# Patient Record
Sex: FEMALE | Race: BLACK OR AFRICAN AMERICAN | NOT HISPANIC OR LATINO | URBAN - METROPOLITAN AREA
[De-identification: names, ages, dates, MRNs, and addresses within clinical notes are randomized per-mention and may not be internally consistent; named-entity substitution may affect disease eponyms.]

---

## 2017-10-07 ENCOUNTER — EMERGENCY (EMERGENCY)
Facility: HOSPITAL | Age: 47
LOS: 1 days | Discharge: HOME | End: 2017-10-07
Admitting: EMERGENCY MEDICINE
Payer: COMMERCIAL

## 2017-10-07 VITALS
WEIGHT: 215.17 LBS | TEMPERATURE: 98 F | RESPIRATION RATE: 16 BRPM | OXYGEN SATURATION: 100 % | SYSTOLIC BLOOD PRESSURE: 114 MMHG | HEART RATE: 102 BPM | HEIGHT: 68 IN | DIASTOLIC BLOOD PRESSURE: 68 MMHG

## 2017-10-07 DIAGNOSIS — Z79.2 LONG TERM (CURRENT) USE OF ANTIBIOTICS: ICD-10-CM

## 2017-10-07 DIAGNOSIS — Z79.1 LONG TERM (CURRENT) USE OF NON-STEROIDAL ANTI-INFLAMMATORIES (NSAID): ICD-10-CM

## 2017-10-07 DIAGNOSIS — L73.2 HIDRADENITIS SUPPURATIVA: ICD-10-CM

## 2017-10-07 DIAGNOSIS — Z79.899 OTHER LONG TERM (CURRENT) DRUG THERAPY: ICD-10-CM

## 2017-10-07 PROCEDURE — 99284 EMERGENCY DEPT VISIT MOD MDM: CPT | Mod: 25

## 2017-10-07 PROCEDURE — 96372 THER/PROPH/DIAG INJ SC/IM: CPT

## 2017-10-07 PROCEDURE — 99284 EMERGENCY DEPT VISIT MOD MDM: CPT

## 2017-10-07 RX ORDER — AZTREONAM 2 G
1 VIAL (EA) INJECTION
Qty: 14 | Refills: 0 | OUTPATIENT
Start: 2017-10-07 | End: 2017-10-14

## 2017-10-07 RX ORDER — AZTREONAM 2 G
1 VIAL (EA) INJECTION
Qty: 14 | Refills: 0
Start: 2017-10-07 | End: 2017-10-14

## 2017-10-07 RX ORDER — KETOROLAC TROMETHAMINE 30 MG/ML
30 SYRINGE (ML) INJECTION ONCE
Qty: 0 | Refills: 0 | Status: DISCONTINUED | OUTPATIENT
Start: 2017-10-07 | End: 2017-10-07

## 2017-10-07 RX ORDER — CEPHALEXIN 500 MG
1 CAPSULE ORAL
Qty: 14 | Refills: 0
Start: 2017-10-07 | End: 2017-10-14

## 2017-10-07 RX ORDER — IBUPROFEN 200 MG
600 TABLET ORAL ONCE
Qty: 0 | Refills: 0 | Status: DISCONTINUED | OUTPATIENT
Start: 2017-10-07 | End: 2017-10-07

## 2017-10-07 RX ORDER — CEPHALEXIN 500 MG
1 CAPSULE ORAL
Qty: 14 | Refills: 0 | OUTPATIENT
Start: 2017-10-07 | End: 2017-10-14

## 2017-10-07 RX ORDER — IBUPROFEN 200 MG
1 TABLET ORAL
Qty: 28 | Refills: 0
Start: 2017-10-07 | End: 2017-10-14

## 2017-10-07 RX ORDER — IBUPROFEN 200 MG
1 TABLET ORAL
Qty: 28 | Refills: 0 | OUTPATIENT
Start: 2017-10-07 | End: 2017-10-14

## 2017-10-07 RX ADMIN — Medication 30 MILLIGRAM(S): at 21:35

## 2017-10-07 RX ADMIN — Medication 30 MILLIGRAM(S): at 21:42

## 2017-10-07 NOTE — ED ADULT NURSE NOTE - OBJECTIVE STATEMENT
Patient to ED alert and orientedx3, complaining of sores around buttocks x 2 months. Denies constipation or straining. Denies abdominal pain.

## 2017-10-07 NOTE — ED PROVIDER NOTE - OBJECTIVE STATEMENT
46 y/o female with a PMHx of hidradenitis suppurativa presents with outbreak. Last year she was treated by dermatology with good results. Pt reports last appointment with dermatology was in January of 2017 without issues this year. Pt reports pain when she sits and states that there is drainage on one of the concerned areas. She denies the following: fever, redness, swelling, recent treatment of antibiotics or trauma. Pt reports she is present due to the pain and she has an appointment [facility] week for continued treatment of her condition.

## 2017-10-07 NOTE — ED PROVIDER NOTE - CARE PLAN
Principal Discharge DX:	Hidradenitis suppurativa  Instructions for follow-up, activity and diet:	Please take medications as directed and follow up with your dermatologists.

## 2017-10-07 NOTE — ED PROVIDER NOTE - MEDICAL DECISION MAKING DETAILS
48 y/o female with hidradenitis suppurativa. PE: multiple deep seated nodules with clear fluid and fibrotic scars scattered. One small non-erythemic actively draining boil not an abscess. No fever, chills, warmth and no signs of cellulitis. Pt was irrigated with one boil drained with minimal pressure. Pt treated with toradol with alleviation of pain and given abx with derm follow up. 48 y/o female with hidradenitis suppurativa. PE: multiple deep seated nodules with clear fluid and fibrotic scars scattered. One small non-erythemic actively draining boil not an abscess. No fever, chills, warmth and no signs of cellulitis. Skin irrigated with one boil drained with minimal pressure. Pt treated with toradol with alleviation of pain and given abx with derm follow up.

## 2017-12-26 ENCOUNTER — EMERGENCY (EMERGENCY)
Facility: HOSPITAL | Age: 47
LOS: 1 days | Discharge: HOME | End: 2017-12-26
Admitting: EMERGENCY MEDICINE
Payer: COMMERCIAL

## 2017-12-26 VITALS
HEART RATE: 94 BPM | SYSTOLIC BLOOD PRESSURE: 136 MMHG | TEMPERATURE: 97 F | RESPIRATION RATE: 18 BRPM | DIASTOLIC BLOOD PRESSURE: 78 MMHG | OXYGEN SATURATION: 99 %

## 2017-12-26 PROCEDURE — 99284 EMERGENCY DEPT VISIT MOD MDM: CPT

## 2017-12-26 PROCEDURE — 96372 THER/PROPH/DIAG INJ SC/IM: CPT

## 2017-12-26 PROCEDURE — 99284 EMERGENCY DEPT VISIT MOD MDM: CPT | Mod: 25

## 2017-12-26 RX ORDER — FLUTICASONE PROPIONATE 50 MCG
1 SPRAY, SUSPENSION NASAL
Qty: 1 | Refills: 0
Start: 2017-12-26 | End: 2018-01-24

## 2017-12-26 RX ORDER — CLINDAMYCIN PHOSPHATE GEL USP, 1% 10 MG/G
1 GEL TOPICAL
Qty: 1 | Refills: 0
Start: 2017-12-26 | End: 2018-01-08

## 2017-12-26 RX ORDER — CEPHALEXIN 500 MG
1 CAPSULE ORAL
Qty: 14 | Refills: 0 | OUTPATIENT
Start: 2017-12-26 | End: 2018-01-01

## 2017-12-26 RX ORDER — IBUPROFEN 200 MG
1 TABLET ORAL
Qty: 15 | Refills: 0
Start: 2017-12-26 | End: 2017-12-30

## 2017-12-26 RX ORDER — AZTREONAM 2 G
1 VIAL (EA) INJECTION
Qty: 14 | Refills: 0 | OUTPATIENT
Start: 2017-12-26 | End: 2018-01-01

## 2017-12-26 RX ORDER — KETOROLAC TROMETHAMINE 30 MG/ML
60 SYRINGE (ML) INJECTION ONCE
Qty: 0 | Refills: 0 | Status: DISCONTINUED | OUTPATIENT
Start: 2017-12-26 | End: 2017-12-26

## 2017-12-26 RX ADMIN — Medication 60 MILLIGRAM(S): at 21:41

## 2017-12-26 NOTE — ED PROVIDER NOTE - CARE PLAN
Principal Discharge DX:	Hidradenitis suppurativa  Secondary Diagnosis:	Acute non-recurrent sinusitis of other sinus

## 2017-12-26 NOTE — ED PROVIDER NOTE - MEDICAL DECISION MAKING DETAILS
46 y/o female with a PMHx of hidradenitis suppurativa presents with pain to buttocks. No fever, chills. Pt states she is having a hidraneitis flare. Responded in past to abx and motrin. Also with sinus congestion x 1 week. VSS. Pharynx clear, no sinus tenderness, lungs cta b/l. Buttocks and between inner thighs- Multiple deep seated nodules with clear fluid and fibrotic scars scattered. Some larger areas of induration without fluctuance. No erythema. Bedside sono done and no obvious collection seen. Will give doxy, clindamycin lotion, motrin. Pt encouraged to f/u with dermatology.

## 2017-12-26 NOTE — ED PROVIDER NOTE - PHYSICAL EXAMINATION
CONSTITUTIONAL: Well-appearing; well-nourished; in no apparent distress.   HEAD: Normocephalic; atraumatic.   EYES: PERRL; EOM intact; conjunctiva and sclera clear  ENT: normal nose; no rhinorrhea; normal pharynx with no erythema or lesions. No mucosal edema. No sinus tenderness  NECK: Supple; non-tender;   CARDIOVASCULAR: Normal S1, S2; no murmurs, rubs, or gallops. Regular rate and rhythm.   RESPIRATORY: Breathing easily; breath sounds clear and equal bilaterally; no wheezes, rhonchi, or rales.  MSK: FROM at all extremities, normal tone   EXT: No cyanosis or edema; N/V intact  SKIN: buttocks and between thighs- multiple deep seated nodules with clear fluid and fibrotic scars scattered. Some larger areas of induration without fluctuance. No erythema.

## 2017-12-26 NOTE — ED PROVIDER NOTE - OBJECTIVE STATEMENT
46 y/o female with a PMHx of hidradenitis suppurativa presents with pain to buttocks. Pt states she has suffered with swelling to the buttocks x 1 year but today the pain worsened. Pt seen in oct and was given abx and motrin which helped her. Pt states she hasn't seen her dermatologist in a long time. Denies fever, chills,  discharge, rectal pain, redness. Pt also c/o nasal congestion and dry cough x 1 week. Denies sore throat, ha, bodyaches, cp, sob. Denies sick contacts or recent travel. Pt states she took theraflu with some relief.

## 2017-12-26 NOTE — ED ADULT NURSE NOTE - OBJECTIVE STATEMENT
Pt presents to ED with c/o swelling on buttocks and also c/o sinus congestion. Pt denies drainage. Seen by provider, will carry out orders.

## 2017-12-30 DIAGNOSIS — Z79.1 LONG TERM (CURRENT) USE OF NON-STEROIDAL ANTI-INFLAMMATORIES (NSAID): ICD-10-CM

## 2017-12-30 DIAGNOSIS — Z79.899 OTHER LONG TERM (CURRENT) DRUG THERAPY: ICD-10-CM

## 2017-12-30 DIAGNOSIS — L73.2 HIDRADENITIS SUPPURATIVA: ICD-10-CM

## 2017-12-30 DIAGNOSIS — J01.80 OTHER ACUTE SINUSITIS: ICD-10-CM

## 2017-12-30 DIAGNOSIS — M53.3 SACROCOCCYGEAL DISORDERS, NOT ELSEWHERE CLASSIFIED: ICD-10-CM

## 2017-12-30 DIAGNOSIS — Z79.2 LONG TERM (CURRENT) USE OF ANTIBIOTICS: ICD-10-CM

## 2018-09-26 ENCOUNTER — EMERGENCY (EMERGENCY)
Facility: HOSPITAL | Age: 48
LOS: 1 days | Discharge: HOME | End: 2018-09-26
Attending: EMERGENCY MEDICINE | Admitting: EMERGENCY MEDICINE
Payer: COMMERCIAL

## 2018-09-26 VITALS
HEIGHT: 68 IN | DIASTOLIC BLOOD PRESSURE: 82 MMHG | SYSTOLIC BLOOD PRESSURE: 137 MMHG | RESPIRATION RATE: 16 BRPM | OXYGEN SATURATION: 99 % | TEMPERATURE: 98 F | HEART RATE: 84 BPM

## 2018-09-26 DIAGNOSIS — Z79.899 OTHER LONG TERM (CURRENT) DRUG THERAPY: ICD-10-CM

## 2018-09-26 DIAGNOSIS — Z79.52 LONG TERM (CURRENT) USE OF SYSTEMIC STEROIDS: ICD-10-CM

## 2018-09-26 DIAGNOSIS — L73.2 HIDRADENITIS SUPPURATIVA: ICD-10-CM

## 2018-09-26 DIAGNOSIS — Z79.1 LONG TERM (CURRENT) USE OF NON-STEROIDAL ANTI-INFLAMMATORIES (NSAID): ICD-10-CM

## 2018-09-26 DIAGNOSIS — R51 HEADACHE: ICD-10-CM

## 2018-09-26 DIAGNOSIS — Z79.2 LONG TERM (CURRENT) USE OF ANTIBIOTICS: ICD-10-CM

## 2018-09-26 PROCEDURE — 99283 EMERGENCY DEPT VISIT LOW MDM: CPT

## 2018-09-26 PROCEDURE — 99283 EMERGENCY DEPT VISIT LOW MDM: CPT | Mod: 25

## 2018-09-26 PROCEDURE — 96372 THER/PROPH/DIAG INJ SC/IM: CPT

## 2018-09-26 RX ORDER — CYCLOBENZAPRINE HYDROCHLORIDE 10 MG/1
1 TABLET, FILM COATED ORAL
Qty: 15 | Refills: 0
Start: 2018-09-26 | End: 2018-09-30

## 2018-09-26 RX ORDER — CYCLOBENZAPRINE HYDROCHLORIDE 10 MG/1
1 TABLET, FILM COATED ORAL
Qty: 15 | Refills: 0 | OUTPATIENT
Start: 2018-09-26 | End: 2018-09-30

## 2018-09-26 RX ORDER — KETOROLAC TROMETHAMINE 30 MG/ML
15 SYRINGE (ML) INJECTION ONCE
Qty: 0 | Refills: 0 | Status: DISCONTINUED | OUTPATIENT
Start: 2018-09-26 | End: 2018-09-26

## 2018-09-26 RX ADMIN — Medication 15 MILLIGRAM(S): at 20:00

## 2018-09-26 NOTE — ED ADULT NURSE NOTE - OBJECTIVE STATEMENT
pt received in [facility] x 3 pt c/o headache for about a week , headache resolved , pt only c/o Right sided neck pain , [t states ' I think its muscular they usually  give me Toradol and it works "

## 2018-09-26 NOTE — ED PROVIDER NOTE - MEDICAL DECISION MAKING DETAILS
48F with chronic headaches, throbbing, improved with naprosyn but continues 48F with chronic headaches, throbbing, improved with naprosyn but continues to have dull persistent headache. No associated weakness, n/v, or nuchal rigidity. Pt nontoxic appearing. Improved symptoms with Toradol 10mg IM. Pt also with R groin hidadrenitis suppurativa. Prescribing topical doxycycline 100mg BID x 14 days. 48F with chronic headaches, throbbing, improved with naprosyn but continues to have dull persistent headache. No associated weakness, n/v, or nuchal rigidity. Pt nontoxic appearing. Improved symptoms with Toradol 10mg IM. Pt also with R groin hidadrenitis suppurativa. Prescribing doxycycline 100mg BID x 14 days.

## 2018-09-26 NOTE — ED PROVIDER NOTE - ATTENDING CONTRIBUTION TO CARE
agree w resident charting and plan, pt stable for out antibiotics, no drainable collection ,   H/A w no neuro deficits and mild / resolved.   stable for outpt f/u    emergent return instructions were discussed with patient

## 2018-09-26 NOTE — ED PROVIDER NOTE - CARE PLAN
Principal Discharge DX:	Headache Principal Discharge DX:	Headache  Secondary Diagnosis:	Hidradenitis suppurativa

## 2018-09-26 NOTE — ED PROVIDER NOTE - PHYSICAL EXAMINATION
PHYSICAL EXAM:    Constitutional: middle-aged female in NAD, appearing younger than stated age  Head: NC/AT  Eyes: EOMI, anicteric sclera  ENT: no nasal discharge; uvula midline, no oropharyngeal erythema or exudates; MMM  Neck: supple  Respiratory: CTA B/L; no W/R/R, no retractions  Cardiac: +S1/S2; RRR; no M/R/G; PMI non-displaced  Gastrointestinal: abdomen soft, NT/ND; no rebound or guarding; +BSx4  Back: spine midline, no bony tenderness or step-offs; no CVAT B/L  Extremities: WWP, no clubbing or cyanosis; no peripheral edema  Dermatologic: skin warm, dry and intact; no rashes, wounds, or scars  Neurologic: AAOx3; CNII-XII grossly intact; no focal deficits  Psychiatric: affect and characteristics of appearance, verbalizations, behaviors are appropriate PHYSICAL EXAM:    Constitutional: middle-aged female in NAD, appearing younger than stated age  Head: NC/AT  Eyes: EOMI, anicteric sclera  ENT: no nasal discharge; uvula midline, no oropharyngeal erythema or exudates; MMM  Neck: supple; NO NUCHAL RIGIDTY  Respiratory: CTA B/L; no W/R/R, no retractions  Cardiac: +S1/S2; RRR; no M/R/G; PMI non-displaced  Gastrointestinal: abdomen soft, NT/ND; no rebound or guarding; +BSx4  Back: spine midline, no bony tenderness or step-offs; no CVAT B/L  Extremities: WWP, no clubbing or cyanosis; no peripheral edema  Dermatologic: skin warm, dry and intact; no rashes, wounds, or scars  Neurologic: AAOx3; CNII-XII grossly intact; no focal deficits  Psychiatric: affect and characteristics of appearance, verbalizations, behaviors are appropriate

## 2018-09-26 NOTE — ED ADULT TRIAGE NOTE - CHIEF COMPLAINT QUOTE
has had HA into neck pain for a week - intermittent and varying intensities - last took naprosyn a few days ago; when she goes to the hospital usually gets Toradol; denies any other symptoms

## 2018-09-26 NOTE — ED PROVIDER NOTE - NS ED ROS FT
REVIEW OF SYSTEMS:    CONSTITUTIONAL: No weakness, fevers or chills  EYES/ENT: occasional blurriness;  No vertigo or throat pain   NECK: No pain or stiffness  RESPIRATORY: No cough, wheezing, hemoptysis; No shortness of breath  CARDIOVASCULAR: No chest pain or palpitations  GASTROINTESTINAL: No abdominal or epigastric pain. No nausea, vomiting, or hematemesis; No diarrhea or constipation. No melena or hematochezia.  GENITOURINARY: No dysuria, frequency or hematuria  NEUROLOGICAL: No numbness or weakness  SKIN: No itching, burning, rashes, or lesions   All other review of systems is negative unless indicated above.

## 2018-10-01 ENCOUNTER — TRANSCRIPTION ENCOUNTER (OUTPATIENT)
Age: 48
End: 2018-10-01

## 2018-10-01 ENCOUNTER — APPOINTMENT (OUTPATIENT)
Dept: INTERNAL MEDICINE | Facility: CLINIC | Age: 48
End: 2018-10-01

## 2018-10-02 ENCOUNTER — APPOINTMENT (OUTPATIENT)
Dept: INTERNAL MEDICINE | Facility: CLINIC | Age: 48
End: 2018-10-02

## 2018-10-09 ENCOUNTER — EMERGENCY (EMERGENCY)
Facility: HOSPITAL | Age: 48
LOS: 1 days | Discharge: HOME | End: 2018-10-09
Admitting: PHYSICIAN ASSISTANT
Payer: COMMERCIAL

## 2018-10-09 VITALS
TEMPERATURE: 98 F | DIASTOLIC BLOOD PRESSURE: 68 MMHG | HEIGHT: 68 IN | WEIGHT: 231.04 LBS | SYSTOLIC BLOOD PRESSURE: 119 MMHG | HEART RATE: 98 BPM | OXYGEN SATURATION: 99 % | RESPIRATION RATE: 18 BRPM

## 2018-10-09 PROCEDURE — 99283 EMERGENCY DEPT VISIT LOW MDM: CPT | Mod: 25

## 2018-10-09 PROCEDURE — 99284 EMERGENCY DEPT VISIT MOD MDM: CPT

## 2018-10-09 PROCEDURE — 96372 THER/PROPH/DIAG INJ SC/IM: CPT

## 2018-10-09 RX ORDER — KETOROLAC TROMETHAMINE 30 MG/ML
60 SYRINGE (ML) INJECTION ONCE
Qty: 0 | Refills: 0 | Status: DISCONTINUED | OUTPATIENT
Start: 2018-10-09 | End: 2018-10-09

## 2018-10-09 RX ADMIN — Medication 60 MILLIGRAM(S): at 14:18

## 2018-10-09 NOTE — ED ADULT NURSE NOTE - CHPI ED NUR SYMPTOMS NEG
no nausea/no dizziness/no loss of consciousness/no numbness/no confusion/no vomiting/no blurred vision/no change in level of consciousness/no fever

## 2018-10-09 NOTE — ED PROVIDER NOTE - NS ED ROS FT
CONSTITUTIONAL: No fever, chills, or weakness  NEURO: No dizziness, no syncope; No focal weakness/tingling/numbness  EYES: No visual changes  ENT: No rhinorrhea or sore throat  PULM: No cough or dyspnea  CV: No chest pain or palpitations  GI: No abdominal pain, vomiting, or diarrhea  : No dysuria, hematuria, frequency  MSK: No back pain, no joint pain  SKIN: no rash or unusual bruising

## 2018-10-09 NOTE — ED PROVIDER NOTE - OBJECTIVE STATEMENT
pt with headaches for over a year.  has had CT and MR negative, told migraines in the past.  she has had her current headache for about a month, located left occipital and radiating to left posterior neck.  constant.  pain worse with turning head.  no fever or chills, nausea/vomiting, focal neuro symptoms.  saw her PCP Dr Hogan last week for this, felt better after a shot of toradol in the office, and taking cyclobenzaprine at home without much relief.  she has an appointment this friday with a neurologist.  has used NSAIDs without much relief.

## 2018-10-09 NOTE — ED PROVIDER NOTE - MEDICAL DECISION MAKING DETAILS
Chronic left sided headache.  Seems muscle tension in etiology.  Not characteristic of SAH/ICH, meningitis, or other serious causes of headache.  Taking muscle relaxants without relief and does not want to continue to use NSAIDS chronically due to her concern for SE with long term use.  Will prescribe short course Fioricet and she has neuro appointment this week.

## 2018-10-09 NOTE — ED PROVIDER NOTE - PHYSICAL EXAMINATION
CONSTITUTIONAL: WD,WN. NAD.    SKIN: Normal color and turgor. No rash.    HEAD: NC/AT.  EYES: Conjunctiva clear. EOMI. PERRL.    ENT: Airway patent, OP without erythema, tonsillar swelling or exudate; uvula midline without swelling. Nasal mucosa clear, no rhinorrhea.   RESPIRATORY:  Breathing non-labored. No retractions or accessory muscle use.  Lungs CTA bilat.  CARDIOVASCULAR:  RRR, S1S2. No M/R/G.      GI:  Abdomen soft, nontender.    MSK: Tenderness to left posterior scalp muscles and left sided posterior neck muscles.   Full ROM, no meningismus.  No lower extremity edema or calf tenderness.  No joint swelling or ROM limitation.  NEURO: Alert and oriented; CN II-XII  intact. Speech clear. 5/5 strength in all extremities.  F-N intact. Normal balance and gait.

## 2018-10-09 NOTE — ED ADULT NURSE NOTE - NSIMPLEMENTINTERV_GEN_ALL_ED
Implemented All Universal Safety Interventions:  [place] to call system. Call bell, personal items and telephone within reach. Instruct patient to call for assistance. Room bathroom lighting operational. Non-slip footwear when patient is off stretcher. Physically safe environment: no spills, clutter or unnecessary equipment. Stretcher in lowest position, wheels locked, appropriate side rails in place.

## 2018-10-12 ENCOUNTER — TRANSCRIPTION ENCOUNTER (OUTPATIENT)
Age: 48
End: 2018-10-12

## 2018-10-13 DIAGNOSIS — G44.229 CHRONIC TENSION-TYPE HEADACHE, NOT INTRACTABLE: ICD-10-CM

## 2018-10-13 DIAGNOSIS — M54.2 CERVICALGIA: ICD-10-CM

## 2018-10-13 DIAGNOSIS — Z79.899 OTHER LONG TERM (CURRENT) DRUG THERAPY: ICD-10-CM

## 2018-10-16 ENCOUNTER — TRANSCRIPTION ENCOUNTER (OUTPATIENT)
Age: 48
End: 2018-10-16

## 2018-10-18 ENCOUNTER — TRANSCRIPTION ENCOUNTER (OUTPATIENT)
Age: 48
End: 2018-10-18

## 2018-11-15 ENCOUNTER — TRANSCRIPTION ENCOUNTER (OUTPATIENT)
Age: 48
End: 2018-11-15

## 2018-11-16 ENCOUNTER — APPOINTMENT (OUTPATIENT)
Dept: INTERNAL MEDICINE | Facility: CLINIC | Age: 48
End: 2018-11-16
Payer: MEDICAID

## 2018-11-16 VITALS
SYSTOLIC BLOOD PRESSURE: 140 MMHG | DIASTOLIC BLOOD PRESSURE: 77 MMHG | HEART RATE: 104 BPM | WEIGHT: 230 LBS | OXYGEN SATURATION: 99 % | BODY MASS INDEX: 34.86 KG/M2 | HEIGHT: 68 IN | TEMPERATURE: 98.5 F

## 2018-11-16 DIAGNOSIS — M50.90 CERVICAL DISC DISORDER, UNSPECIFIED, UNSPECIFIED CERVICAL REGION: ICD-10-CM

## 2018-11-16 PROCEDURE — 36415 COLL VENOUS BLD VENIPUNCTURE: CPT

## 2018-11-16 PROCEDURE — 99204 OFFICE O/P NEW MOD 45 MIN: CPT | Mod: 25

## 2018-11-20 ENCOUNTER — OTHER (OUTPATIENT)
Age: 48
End: 2018-11-20

## 2018-11-20 LAB
ANION GAP SERPL CALC-SCNC: 12 MMOL/L
APPEARANCE: CLEAR
BACTERIA: ABNORMAL
BASOPHILS # BLD AUTO: 0.03 K/UL
BASOPHILS NFR BLD AUTO: 0.5 %
BILIRUBIN URINE: NEGATIVE
BLOOD URINE: NEGATIVE
BUN SERPL-MCNC: 8 MG/DL
CALCIUM SERPL-MCNC: 9.7 MG/DL
CHLORIDE SERPL-SCNC: 104 MMOL/L
CHOLEST SERPL-MCNC: 156 MG/DL
CHOLEST/HDLC SERPL: 2.9 RATIO
CO2 SERPL-SCNC: 26 MMOL/L
COLOR: YELLOW
CREAT SERPL-MCNC: 0.67 MG/DL
EOSINOPHIL # BLD AUTO: 0.1 K/UL
EOSINOPHIL NFR BLD AUTO: 1.8 %
GLUCOSE QUALITATIVE U: NEGATIVE MG/DL
GLUCOSE SERPL-MCNC: 96 MG/DL
HBA1C MFR BLD HPLC: 5.7 %
HCT VFR BLD CALC: 39.3 %
HDLC SERPL-MCNC: 53 MG/DL
HGB BLD-MCNC: 11.9 G/DL
HYALINE CASTS: 1 /LPF
IMM GRANULOCYTES NFR BLD AUTO: 0.2 %
KETONES URINE: NEGATIVE
LDLC SERPL CALC-MCNC: 92 MG/DL
LEUKOCYTE ESTERASE URINE: NEGATIVE
LYMPHOCYTES # BLD AUTO: 1.99 K/UL
LYMPHOCYTES NFR BLD AUTO: 35.1 %
MAN DIFF?: NORMAL
MCHC RBC-ENTMCNC: 29.6 PG
MCHC RBC-ENTMCNC: 30.3 GM/DL
MCV RBC AUTO: 97.8 FL
MICROSCOPIC-UA: NORMAL
MONOCYTES # BLD AUTO: 0.42 K/UL
MONOCYTES NFR BLD AUTO: 7.4 %
NEUTROPHILS # BLD AUTO: 3.12 K/UL
NEUTROPHILS NFR BLD AUTO: 55 %
NITRITE URINE: NEGATIVE
PH URINE: 6.5
PLATELET # BLD AUTO: 390 K/UL
POTASSIUM SERPL-SCNC: 4.3 MMOL/L
PROTEIN URINE: NEGATIVE MG/DL
RBC # BLD: 4.02 M/UL
RBC # FLD: 14.7 %
RED BLOOD CELLS URINE: 2 /HPF
SODIUM SERPL-SCNC: 142 MMOL/L
SPECIFIC GRAVITY URINE: 1.01
SQUAMOUS EPITHELIAL CELLS: 1 /HPF
TRIGL SERPL-MCNC: 56 MG/DL
TSH SERPL-ACNC: 2.09 UIU/ML
URINE COMMENTS: NORMAL
UROBILINOGEN URINE: NEGATIVE MG/DL
WBC # FLD AUTO: 5.67 K/UL
WHITE BLOOD CELLS URINE: 0 /HPF

## 2019-06-20 ENCOUNTER — APPOINTMENT (OUTPATIENT)
Dept: INTERNAL MEDICINE | Facility: CLINIC | Age: 49
End: 2019-06-20
Payer: MEDICAID

## 2019-06-20 VITALS
WEIGHT: 238 LBS | HEIGHT: 68 IN | BODY MASS INDEX: 36.07 KG/M2 | TEMPERATURE: 98.5 F | DIASTOLIC BLOOD PRESSURE: 74 MMHG | SYSTOLIC BLOOD PRESSURE: 118 MMHG | OXYGEN SATURATION: 100 % | HEART RATE: 77 BPM

## 2019-06-20 DIAGNOSIS — L68.0 HIRSUTISM: ICD-10-CM

## 2019-06-20 PROCEDURE — 99214 OFFICE O/P EST MOD 30 MIN: CPT | Mod: GC

## 2019-06-20 RX ORDER — MUPIROCIN 20 MG/G
2 OINTMENT TOPICAL 3 TIMES DAILY
Qty: 1 | Refills: 0 | Status: ACTIVE | COMMUNITY
Start: 2019-06-20 | End: 1900-01-01

## 2019-11-15 ENCOUNTER — APPOINTMENT (OUTPATIENT)
Dept: INTERNAL MEDICINE | Facility: CLINIC | Age: 49
End: 2019-11-15

## 2020-02-20 ENCOUNTER — APPOINTMENT (OUTPATIENT)
Dept: INTERNAL MEDICINE | Facility: CLINIC | Age: 50
End: 2020-02-20
Payer: MEDICAID

## 2020-02-20 VITALS
WEIGHT: 238 LBS | SYSTOLIC BLOOD PRESSURE: 149 MMHG | OXYGEN SATURATION: 100 % | BODY MASS INDEX: 36.19 KG/M2 | HEART RATE: 96 BPM | TEMPERATURE: 98.1 F | DIASTOLIC BLOOD PRESSURE: 84 MMHG

## 2020-02-20 DIAGNOSIS — R03.0 ELEVATED BLOOD-PRESSURE READING, W/OUT DIAGNOSIS OF HYPERTENSION: ICD-10-CM

## 2020-02-20 DIAGNOSIS — Z13.89 ENCOUNTER FOR SCREENING FOR OTHER DISORDER: ICD-10-CM

## 2020-02-20 DIAGNOSIS — L73.2 HIDRADENITIS SUPPURATIVA: ICD-10-CM

## 2020-02-20 DIAGNOSIS — Z87.2 PERSONAL HISTORY OF DISEASES OF THE SKIN AND SUBCUTANEOUS TISSUE: ICD-10-CM

## 2020-02-20 DIAGNOSIS — Z13.220 ENCOUNTER FOR SCREENING FOR LIPOID DISORDERS: ICD-10-CM

## 2020-02-20 DIAGNOSIS — Z12.11 ENCOUNTER FOR SCREENING FOR MALIGNANT NEOPLASM OF COLON: ICD-10-CM

## 2020-02-20 DIAGNOSIS — R80.9 PROTEINURIA, UNSPECIFIED: ICD-10-CM

## 2020-02-20 DIAGNOSIS — Z51.81 ENCOUNTER FOR THERAPEUTIC DRUG LVL MONITORING: ICD-10-CM

## 2020-02-20 DIAGNOSIS — Z12.4 ENCOUNTER FOR SCREENING FOR MALIGNANT NEOPLASM OF CERVIX: ICD-10-CM

## 2020-02-20 DIAGNOSIS — Z28.21 IMMUNIZATION NOT CARRIED OUT BECAUSE OF PATIENT REFUSAL: ICD-10-CM

## 2020-02-20 DIAGNOSIS — Z12.31 ENCOUNTER FOR SCREENING MAMMOGRAM FOR MALIGNANT NEOPLASM OF BREAST: ICD-10-CM

## 2020-02-20 DIAGNOSIS — E66.09 OTHER OBESITY DUE TO EXCESS CALORIES: ICD-10-CM

## 2020-02-20 PROCEDURE — G0444 DEPRESSION SCREEN ANNUAL: CPT

## 2020-02-20 PROCEDURE — 36415 COLL VENOUS BLD VENIPUNCTURE: CPT

## 2020-02-20 PROCEDURE — 99396 PREV VISIT EST AGE 40-64: CPT | Mod: GC,25

## 2020-02-20 RX ORDER — BLOOD PRESSURE TEST KIT-LARGE
KIT MISCELLANEOUS
Qty: 1 | Refills: 0 | Status: ACTIVE | COMMUNITY
Start: 2020-02-20 | End: 1900-01-01

## 2020-02-21 LAB
ANION GAP SERPL CALC-SCNC: 12 MMOL/L
BUN SERPL-MCNC: 9 MG/DL
CALCIUM SERPL-MCNC: 9.7 MG/DL
CHLORIDE SERPL-SCNC: 103 MMOL/L
CHOLEST SERPL-MCNC: 168 MG/DL
CHOLEST/HDLC SERPL: 3.2 RATIO
CO2 SERPL-SCNC: 25 MMOL/L
CREAT SERPL-MCNC: 0.72 MG/DL
ESTIMATED AVERAGE GLUCOSE: 114 MG/DL
GLUCOSE SERPL-MCNC: 78 MG/DL
HBA1C MFR BLD HPLC: 5.6 %
HDLC SERPL-MCNC: 53 MG/DL
LDLC SERPL CALC-MCNC: 94 MG/DL
POTASSIUM SERPL-SCNC: 4.1 MMOL/L
SODIUM SERPL-SCNC: 139 MMOL/L
TRIGL SERPL-MCNC: 103 MG/DL

## 2020-07-27 ENCOUNTER — APPOINTMENT (OUTPATIENT)
Dept: OBGYN | Facility: CLINIC | Age: 50
End: 2020-07-27

## 2020-08-03 ENCOUNTER — APPOINTMENT (OUTPATIENT)
Dept: OBGYN | Facility: CLINIC | Age: 50
End: 2020-08-03

## 2020-09-14 NOTE — ED ADULT NURSE NOTE - DISCHARGE DATE/TIME
I have personally seen and examined this patient.  I have fully participated in the care of this patient. I have reviewed all pertinent clinical information, including history, physical exam, plan and the Resident’s note and agree except as noted.
09-Oct-2018 15:42

## 2021-01-21 ENCOUNTER — TRANSCRIPTION ENCOUNTER (OUTPATIENT)
Age: 51
End: 2021-01-21

## 2021-04-30 ENCOUNTER — APPOINTMENT (OUTPATIENT)
Dept: INTERNAL MEDICINE | Facility: CLINIC | Age: 51
End: 2021-04-30

## 2021-05-12 ENCOUNTER — TRANSCRIPTION ENCOUNTER (OUTPATIENT)
Age: 51
End: 2021-05-12

## 2021-05-12 ENCOUNTER — APPOINTMENT (OUTPATIENT)
Dept: INTERNAL MEDICINE | Facility: CLINIC | Age: 51
End: 2021-05-12

## 2021-05-13 ENCOUNTER — NON-APPOINTMENT (OUTPATIENT)
Age: 51
End: 2021-05-13

## 2021-05-13 ENCOUNTER — APPOINTMENT (OUTPATIENT)
Dept: INTERNAL MEDICINE | Facility: CLINIC | Age: 51
End: 2021-05-13

## 2021-05-18 ENCOUNTER — RESULT REVIEW (OUTPATIENT)
Age: 51
End: 2021-05-18

## 2021-05-18 ENCOUNTER — NON-APPOINTMENT (OUTPATIENT)
Age: 51
End: 2021-05-18

## 2021-05-18 ENCOUNTER — APPOINTMENT (OUTPATIENT)
Dept: INTERNAL MEDICINE | Facility: CLINIC | Age: 51
End: 2021-05-18
Payer: MEDICAID

## 2021-05-18 ENCOUNTER — OUTPATIENT (OUTPATIENT)
Dept: OUTPATIENT SERVICES | Facility: HOSPITAL | Age: 51
LOS: 1 days | End: 2021-05-18
Payer: COMMERCIAL

## 2021-05-18 VITALS
TEMPERATURE: 97.1 F | SYSTOLIC BLOOD PRESSURE: 162 MMHG | OXYGEN SATURATION: 100 % | DIASTOLIC BLOOD PRESSURE: 100 MMHG | HEART RATE: 90 BPM

## 2021-05-18 DIAGNOSIS — R73.03 PREDIABETES.: ICD-10-CM

## 2021-05-18 DIAGNOSIS — Z00.00 ENCOUNTER FOR GENERAL ADULT MEDICAL EXAMINATION W/OUT ABNORMAL FINDINGS: ICD-10-CM

## 2021-05-18 DIAGNOSIS — R92.8 OTHER ABNORMAL AND INCONCLUSIVE FINDINGS ON DIAGNOSTIC IMAGING OF BREAST: ICD-10-CM

## 2021-05-18 DIAGNOSIS — Z12.39 ENCOUNTER FOR OTHER SCREENING FOR MALIGNANT NEOPLASM OF BREAST: ICD-10-CM

## 2021-05-18 DIAGNOSIS — Z13.1 ENCOUNTER FOR SCREENING FOR DIABETES MELLITUS: ICD-10-CM

## 2021-05-18 PROCEDURE — 99214 OFFICE O/P EST MOD 30 MIN: CPT | Mod: GC,25

## 2021-05-18 PROCEDURE — 73590 X-RAY EXAM OF LOWER LEG: CPT

## 2021-05-18 PROCEDURE — 73590 X-RAY EXAM OF LOWER LEG: CPT | Mod: 26,RT

## 2021-05-23 LAB
ALBUMIN SERPL ELPH-MCNC: 4.3 G/DL
ALP BLD-CCNC: 91 U/L
ALT SERPL-CCNC: 13 U/L
ANION GAP SERPL CALC-SCNC: 14 MMOL/L
AST SERPL-CCNC: 17 U/L
BASOPHILS # BLD AUTO: 0.05 K/UL
BASOPHILS NFR BLD AUTO: 0.6 %
BILIRUB SERPL-MCNC: 0.3 MG/DL
BUN SERPL-MCNC: 10 MG/DL
CALCIUM SERPL-MCNC: 9.6 MG/DL
CHLORIDE SERPL-SCNC: 105 MMOL/L
CHOLEST SERPL-MCNC: 157 MG/DL
CO2 SERPL-SCNC: 23 MMOL/L
CREAT SERPL-MCNC: 0.77 MG/DL
EOSINOPHIL # BLD AUTO: 0.18 K/UL
EOSINOPHIL NFR BLD AUTO: 2.3 %
ESTIMATED AVERAGE GLUCOSE: 120 MG/DL
GLUCOSE SERPL-MCNC: 91 MG/DL
HBA1C MFR BLD HPLC: 5.8 %
HCT VFR BLD CALC: 39.1 %
HDLC SERPL-MCNC: 47 MG/DL
HGB BLD-MCNC: 11.8 G/DL
IMM GRANULOCYTES NFR BLD AUTO: 0.4 %
LDLC SERPL CALC-MCNC: 91 MG/DL
LYMPHOCYTES # BLD AUTO: 2.44 K/UL
LYMPHOCYTES NFR BLD AUTO: 31 %
MAN DIFF?: NORMAL
MCHC RBC-ENTMCNC: 29.9 PG
MCHC RBC-ENTMCNC: 30.2 GM/DL
MCV RBC AUTO: 99.2 FL
MONOCYTES # BLD AUTO: 0.54 K/UL
MONOCYTES NFR BLD AUTO: 6.9 %
NEUTROPHILS # BLD AUTO: 4.64 K/UL
NEUTROPHILS NFR BLD AUTO: 58.8 %
NONHDLC SERPL-MCNC: 110 MG/DL
PLATELET # BLD AUTO: 341 K/UL
POTASSIUM SERPL-SCNC: 4.1 MMOL/L
PROT SERPL-MCNC: 7.8 G/DL
RBC # BLD: 3.94 M/UL
RBC # FLD: 14.4 %
SODIUM SERPL-SCNC: 142 MMOL/L
TRIGL SERPL-MCNC: 94 MG/DL
WBC # FLD AUTO: 7.88 K/UL

## 2021-06-04 ENCOUNTER — NON-APPOINTMENT (OUTPATIENT)
Age: 51
End: 2021-06-04

## 2021-06-04 ENCOUNTER — APPOINTMENT (OUTPATIENT)
Dept: INTERNAL MEDICINE | Facility: CLINIC | Age: 51
End: 2021-06-04

## 2021-06-09 ENCOUNTER — NON-APPOINTMENT (OUTPATIENT)
Age: 51
End: 2021-06-09

## 2021-06-09 ENCOUNTER — APPOINTMENT (OUTPATIENT)
Dept: INTERNAL MEDICINE | Facility: CLINIC | Age: 51
End: 2021-06-09

## 2021-06-10 ENCOUNTER — NON-APPOINTMENT (OUTPATIENT)
Age: 51
End: 2021-06-10

## 2021-06-10 ENCOUNTER — APPOINTMENT (OUTPATIENT)
Dept: INTERNAL MEDICINE | Facility: CLINIC | Age: 51
End: 2021-06-10
Payer: MEDICAID

## 2021-06-10 DIAGNOSIS — L03.90 CELLULITIS, UNSPECIFIED: ICD-10-CM

## 2021-06-10 PROCEDURE — 99442: CPT

## 2021-07-02 ENCOUNTER — APPOINTMENT (OUTPATIENT)
Dept: INTERNAL MEDICINE | Facility: CLINIC | Age: 51
End: 2021-07-02

## 2021-11-20 ENCOUNTER — INPATIENT (INPATIENT)
Facility: HOSPITAL | Age: 51
LOS: 2 days | Discharge: HOME | DRG: 603 | End: 2021-11-23
Attending: INTERNAL MEDICINE | Admitting: INTERNAL MEDICINE
Payer: COMMERCIAL

## 2021-11-20 VITALS
SYSTOLIC BLOOD PRESSURE: 156 MMHG | HEART RATE: 91 BPM | OXYGEN SATURATION: 100 % | HEIGHT: 68 IN | WEIGHT: 190.04 LBS | RESPIRATION RATE: 20 BRPM | DIASTOLIC BLOOD PRESSURE: 91 MMHG | TEMPERATURE: 98 F

## 2021-11-20 DIAGNOSIS — D64.9 ANEMIA, UNSPECIFIED: ICD-10-CM

## 2021-11-20 DIAGNOSIS — L73.2 HIDRADENITIS SUPPURATIVA: Chronic | ICD-10-CM

## 2021-11-20 DIAGNOSIS — R74.01 ELEVATION OF LEVELS OF LIVER TRANSAMINASE LEVELS: ICD-10-CM

## 2021-11-20 DIAGNOSIS — R03.0 ELEVATED BLOOD-PRESSURE READING, WITHOUT DIAGNOSIS OF HYPERTENSION: ICD-10-CM

## 2021-11-20 DIAGNOSIS — L03.90 CELLULITIS, UNSPECIFIED: ICD-10-CM

## 2021-11-20 DIAGNOSIS — E66.3 OVERWEIGHT: ICD-10-CM

## 2021-11-20 DIAGNOSIS — R63.8 OTHER SYMPTOMS AND SIGNS CONCERNING FOOD AND FLUID INTAKE: ICD-10-CM

## 2021-11-20 DIAGNOSIS — L73.2 HIDRADENITIS SUPPURATIVA: ICD-10-CM

## 2021-11-20 LAB
ALBUMIN SERPL ELPH-MCNC: 4 G/DL — SIGNIFICANT CHANGE UP (ref 3.3–5)
ALP SERPL-CCNC: 103 U/L — SIGNIFICANT CHANGE UP (ref 40–120)
ALT FLD-CCNC: 75 U/L — HIGH (ref 10–45)
ANION GAP SERPL CALC-SCNC: 8 MMOL/L — SIGNIFICANT CHANGE UP (ref 5–17)
AST SERPL-CCNC: 80 U/L — HIGH (ref 10–40)
BASOPHILS # BLD AUTO: 0.04 K/UL — SIGNIFICANT CHANGE UP (ref 0–0.2)
BASOPHILS NFR BLD AUTO: 0.5 % — SIGNIFICANT CHANGE UP (ref 0–2)
BILIRUB SERPL-MCNC: 0.2 MG/DL — SIGNIFICANT CHANGE UP (ref 0.2–1.2)
BUN SERPL-MCNC: 8 MG/DL — SIGNIFICANT CHANGE UP (ref 7–23)
CALCIUM SERPL-MCNC: 9.3 MG/DL — SIGNIFICANT CHANGE UP (ref 8.4–10.5)
CHLORIDE SERPL-SCNC: 105 MMOL/L — SIGNIFICANT CHANGE UP (ref 96–108)
CO2 SERPL-SCNC: 26 MMOL/L — SIGNIFICANT CHANGE UP (ref 22–31)
CREAT SERPL-MCNC: 0.75 MG/DL — SIGNIFICANT CHANGE UP (ref 0.5–1.3)
EOSINOPHIL # BLD AUTO: 0.17 K/UL — SIGNIFICANT CHANGE UP (ref 0–0.5)
EOSINOPHIL NFR BLD AUTO: 2 % — SIGNIFICANT CHANGE UP (ref 0–6)
GLUCOSE SERPL-MCNC: 78 MG/DL — SIGNIFICANT CHANGE UP (ref 70–99)
HCT VFR BLD CALC: 33.7 % — LOW (ref 34.5–45)
HGB BLD-MCNC: 10.6 G/DL — LOW (ref 11.5–15.5)
IMM GRANULOCYTES NFR BLD AUTO: 0.3 % — SIGNIFICANT CHANGE UP (ref 0–1.5)
LACTATE SERPL-SCNC: 1.1 MMOL/L — SIGNIFICANT CHANGE UP (ref 0.5–2)
LYMPHOCYTES # BLD AUTO: 1.75 K/UL — SIGNIFICANT CHANGE UP (ref 1–3.3)
LYMPHOCYTES # BLD AUTO: 20.1 % — SIGNIFICANT CHANGE UP (ref 13–44)
MCHC RBC-ENTMCNC: 29.6 PG — SIGNIFICANT CHANGE UP (ref 27–34)
MCHC RBC-ENTMCNC: 31.5 GM/DL — LOW (ref 32–36)
MCV RBC AUTO: 94.1 FL — SIGNIFICANT CHANGE UP (ref 80–100)
MONOCYTES # BLD AUTO: 0.71 K/UL — SIGNIFICANT CHANGE UP (ref 0–0.9)
MONOCYTES NFR BLD AUTO: 8.2 % — SIGNIFICANT CHANGE UP (ref 2–14)
NEUTROPHILS # BLD AUTO: 6 K/UL — SIGNIFICANT CHANGE UP (ref 1.8–7.4)
NEUTROPHILS NFR BLD AUTO: 68.9 % — SIGNIFICANT CHANGE UP (ref 43–77)
NRBC # BLD: 0 /100 WBCS — SIGNIFICANT CHANGE UP (ref 0–0)
PLATELET # BLD AUTO: 374 K/UL — SIGNIFICANT CHANGE UP (ref 150–400)
POTASSIUM SERPL-MCNC: 3.9 MMOL/L — SIGNIFICANT CHANGE UP (ref 3.5–5.3)
POTASSIUM SERPL-SCNC: 3.9 MMOL/L — SIGNIFICANT CHANGE UP (ref 3.5–5.3)
PROT SERPL-MCNC: 8.5 G/DL — HIGH (ref 6–8.3)
RBC # BLD: 3.58 M/UL — LOW (ref 3.8–5.2)
RBC # FLD: 13.7 % — SIGNIFICANT CHANGE UP (ref 10.3–14.5)
SARS-COV-2 RNA SPEC QL NAA+PROBE: NEGATIVE — SIGNIFICANT CHANGE UP
SODIUM SERPL-SCNC: 139 MMOL/L — SIGNIFICANT CHANGE UP (ref 135–145)
WBC # BLD: 8.7 K/UL — SIGNIFICANT CHANGE UP (ref 3.8–10.5)
WBC # FLD AUTO: 8.7 K/UL — SIGNIFICANT CHANGE UP (ref 3.8–10.5)

## 2021-11-20 PROCEDURE — 99285 EMERGENCY DEPT VISIT HI MDM: CPT

## 2021-11-20 PROCEDURE — 73701 CT LOWER EXTREMITY W/DYE: CPT | Mod: 26,RT,MC

## 2021-11-20 PROCEDURE — 99223 1ST HOSP IP/OBS HIGH 75: CPT | Mod: GC

## 2021-11-20 RX ORDER — LANOLIN ALCOHOL/MO/W.PET/CERES
3 CREAM (GRAM) TOPICAL AT BEDTIME
Refills: 0 | Status: DISCONTINUED | OUTPATIENT
Start: 2021-11-20 | End: 2021-11-23

## 2021-11-20 RX ORDER — VANCOMYCIN HCL 1 G
1000 VIAL (EA) INTRAVENOUS EVERY 12 HOURS
Refills: 0 | Status: COMPLETED | OUTPATIENT
Start: 2021-11-21 | End: 2021-11-21

## 2021-11-20 RX ORDER — MORPHINE SULFATE 50 MG/1
2 CAPSULE, EXTENDED RELEASE ORAL ONCE
Refills: 0 | Status: DISCONTINUED | OUTPATIENT
Start: 2021-11-20 | End: 2021-11-20

## 2021-11-20 RX ORDER — PIPERACILLIN AND TAZOBACTAM 4; .5 G/20ML; G/20ML
3.38 INJECTION, POWDER, LYOPHILIZED, FOR SOLUTION INTRAVENOUS ONCE
Refills: 0 | Status: COMPLETED | OUTPATIENT
Start: 2021-11-20 | End: 2021-11-20

## 2021-11-20 RX ORDER — INFLUENZA VIRUS VACCINE 15; 15; 15; 15 UG/.5ML; UG/.5ML; UG/.5ML; UG/.5ML
0.5 SUSPENSION INTRAMUSCULAR ONCE
Refills: 0 | Status: DISCONTINUED | OUTPATIENT
Start: 2021-11-20 | End: 2021-11-23

## 2021-11-20 RX ORDER — ACETAMINOPHEN 500 MG
1000 TABLET ORAL ONCE
Refills: 0 | Status: COMPLETED | OUTPATIENT
Start: 2021-11-20 | End: 2021-11-20

## 2021-11-20 RX ORDER — ACETAMINOPHEN 500 MG
650 TABLET ORAL EVERY 6 HOURS
Refills: 0 | Status: DISCONTINUED | OUTPATIENT
Start: 2021-11-20 | End: 2021-11-23

## 2021-11-20 RX ORDER — MORPHINE SULFATE 50 MG/1
4 CAPSULE, EXTENDED RELEASE ORAL ONCE
Refills: 0 | Status: DISCONTINUED | OUTPATIENT
Start: 2021-11-20 | End: 2021-11-20

## 2021-11-20 RX ORDER — PIPERACILLIN AND TAZOBACTAM 4; .5 G/20ML; G/20ML
3.38 INJECTION, POWDER, LYOPHILIZED, FOR SOLUTION INTRAVENOUS EVERY 6 HOURS
Refills: 0 | Status: DISCONTINUED | OUTPATIENT
Start: 2021-11-21 | End: 2021-11-21

## 2021-11-20 RX ORDER — ONDANSETRON 8 MG/1
4 TABLET, FILM COATED ORAL ONCE
Refills: 0 | Status: COMPLETED | OUTPATIENT
Start: 2021-11-20 | End: 2021-11-20

## 2021-11-20 RX ORDER — VANCOMYCIN HCL 1 G
1000 VIAL (EA) INTRAVENOUS ONCE
Refills: 0 | Status: COMPLETED | OUTPATIENT
Start: 2021-11-20 | End: 2021-11-20

## 2021-11-20 RX ADMIN — ONDANSETRON 4 MILLIGRAM(S): 8 TABLET, FILM COATED ORAL at 15:22

## 2021-11-20 RX ADMIN — MORPHINE SULFATE 2 MILLIGRAM(S): 50 CAPSULE, EXTENDED RELEASE ORAL at 19:35

## 2021-11-20 RX ADMIN — Medication 250 MILLIGRAM(S): at 15:51

## 2021-11-20 RX ADMIN — Medication 1000 MILLIGRAM(S): at 15:21

## 2021-11-20 RX ADMIN — PIPERACILLIN AND TAZOBACTAM 200 GRAM(S): 4; .5 INJECTION, POWDER, LYOPHILIZED, FOR SOLUTION INTRAVENOUS at 23:43

## 2021-11-20 RX ADMIN — PIPERACILLIN AND TAZOBACTAM 200 GRAM(S): 4; .5 INJECTION, POWDER, LYOPHILIZED, FOR SOLUTION INTRAVENOUS at 15:21

## 2021-11-20 RX ADMIN — MORPHINE SULFATE 4 MILLIGRAM(S): 50 CAPSULE, EXTENDED RELEASE ORAL at 21:30

## 2021-11-20 NOTE — H&P ADULT - PROBLEM SELECTOR PLAN 5
Hgb 10.6/Hct 33.7. Denies any reports of melena and bloody bowel movements.   - f/up iron panel in AM  - active T&S  - transfuse for Hgb < 7

## 2021-11-20 NOTE — ED PROVIDER NOTE - CLINICAL SUMMARY MEDICAL DECISION MAKING FREE TEXT BOX
52 y/o F pt presents to the ED with right lower extremity wound/abscess consistent with cellulitis infection. Will check labs and CT scan of the R-sided lower extremity with IV contrast, and give zosyn and vancomycin. Dispo pending workup and reevaluation. 52 y/o F pt presents to the ED with right lower extremity wound/abscess consistent with cellulitis infection. Will check labs and CT scan of the R-sided lower extremity with IV contrast, and give zosyn and vancomycin. Dispo pending workup and reevaluation.    Signed out to Dr. Pemberton pending CT results and admission.

## 2021-11-20 NOTE — ED PROVIDER NOTE - PROGRESS NOTE DETAILS
case discussed with radiology resident as ct > 4 hrs without reading. report sent to vrad. await report. per resident phone read, no gas, no abscess, just subcutaneous edema. -Dr. Pemberton IMPRESSION:  1. The soft tissue demonstrates moderate diffuse induration of the soft tissue extending from the  lower right thigh through the right calf and ankle. Differential includes edema; cannot exclude  cellulitis. This is slightly more prominent on the lateral aspect of the right calf compared to left. There  is no evidence of any fluid encased lesions suggestive of mature abscess. No evidence soft tissue  air.  2. The bones are nonacute; see incidental findings above .  Thank you for allowing us to participate in the care of your patient.  Dictated and Authenticated by: Domingo Gunn MD  11/20/2021 9:37 PM Eastern Time ([place] & [place])

## 2021-11-20 NOTE — ED PROVIDER NOTE - PHYSICAL EXAMINATION
VITAL SIGNS: I have reviewed nursing notes and confirm.  CONSTITUTIONAL: Well-developed; well-nourished; in no acute distress.  SKIN: Agree with RN documentation regarding decubitus evaluation. 10.0cm circumferential ulceration to the RLE that is warm with purulent drainage, tender to touch. Remainder of skin exam is warm and dry, no acute rash.  HEAD: Normocephalic; atraumatic.  EYES: PERRL, EOM intact; conjunctiva and sclera clear.  ENT: No nasal discharge; airway clear.  NECK: Supple; non tender.  CARD: S1, S2 normal; no murmurs, gallops, or rubs. Regular rate and rhythm.  RESP: No wheezes, rales or rhonchi.  ABD: Normal bowel sounds; soft; non-distended; non-tender; no hepatosplenomegaly.  EXT: Normal ROM. No clubbing, cyanosis. (+) swelling to the lower extremity b/l with RLE greater than LLE. 10.0cm circumferential ulceration that is warm with purulent drainage, tender to touch. Good distal pulses. Able to range at the knee, hip, and ankle.   LYMPH: No acute cervical adenopathy.  NEURO: Alert, oriented. Grossly unremarkable.  PSYCH: Cooperative, appropriate.

## 2021-11-20 NOTE — ED ADULT NURSE NOTE - NS ED NURSE LEVEL OF CONSCIOUSNESS AFFECT
[FreeTextEntry1] : Patient is a 59yo M with HLD, obesity, HTN, IFG, fatty liver, former smoker here for cardiac follow up. \par -Echo with borderline increased septal wall, otherwise unremarkable\par -Needs to lose weight\par -Suspect FRANK complicating HTN, also work stress\par \par 1. Change valsartan to Valsartan-/25mg daily, check BMP/Mg in a couple weeks\par 2.Sleep study, has known FRANK from years ago and needs to be treated/evaluated\par 3. Recommend aggressive diet and lifestyle modifications , counselled on weight loss\par 4. Follow up 2 months\par 5. Regular follow up with primary\par \par  Calm

## 2021-11-20 NOTE — H&P ADULT - PROBLEM SELECTOR PLAN 7
F: tolerating PO, no IVF  E: replete K<4, Mg<2  N: regular    VTE Prophylaxis: None  GI: not needed  C: Full Code  D: RMF

## 2021-11-20 NOTE — ED ADULT TRIAGE NOTE - CHIEF COMPLAINT QUOTE
Pt co RLE wound, pt describes wound developed a few months ago and is getting worse. Denies fevers, chills, drainage from site. Denies hx of PAD, PVD, DM.

## 2021-11-20 NOTE — ED ADULT NURSE NOTE - SUICIDE SCREENING QUESTION 1
OB Operative/Delivery Note


Delivery Dr/Surgeon: Darrin BENITEZ 


Pre-Delivery Diagnosis: active labor


Procedure/Post Delivery Dx: spontaneous vaginal delivery


Weeks gestation: 39


Anesthesia: epidural





- Findings


  ** A


Sex: female


Weight: 7 lb 11 oz


Apgar - 1 min: 8


Apgar - 5 min: 9





- Additional Findings/Plan


Placenta delivered: spontaneous


Repaired Obstetrical Laceration: none


Estimated blood loss: 150mL


Post delivery plan: routine recovery No

## 2021-11-20 NOTE — H&P ADULT - HISTORY OF PRESENT ILLNESS
52 y/o Female with PMHx hydradenitis suppurativa (not on home medications) who presents with worsening right lower extremity wound for the past week. She reports that wound began as a 1cm bump behind her right calve that gradually enlarged and is now spreading distally down to her achilles region, along with persistent drainage of yellowish consistency. Also admits to burning sensation below the knee and pain is only experienced with palpation of the leg. She reports being able to ambulate with ease. No numbness and tingling sensation of the leg. Patient also denies any recent trauma, including no punctures with nails/construction materials. Of note, patient with similar history of RLE cellulitis in May-June 2021 for which she received CT and ultrasound that were negative for a clot and she was treated with a course of bactrim. Also noticed <1cm nodular oozing near R side bikini line that is minimally tender to palpation. On remaining ROS, denies any fevers, chills, CP, SOB, abdominal pain, nausea/vomiting/diarrhea.     ED Course  Vitals: 97.8F (oral), HR 91, /91, 100% on RA, RR 20  Labs: notable for Hgb 10.6/Hct 33.7, AST 80/ALT 75, lactate neg (1.1)  Imaging: CT RLE w/ IV contrast (prelim) - 1. The soft tissue demonstrates moderate diffuse induration of the soft tissue extending from the lower right thigh through the right calf and ankle. Differential includes edema; cannot exclude cellulitis. This is slightly more prominent on the lateral aspect of the right calf compared to left. There is no evidence of any fluid encased lesions suggestive of mature abscess. No evidence soft tissue air.  Management: vanc 1g, zosyn 3.375 x1, zofran 4mg IV x1, morphine 2mg x1, morphine 4mg x1, tylenol 1g x1

## 2021-11-20 NOTE — ED ADULT NURSE NOTE - OBJECTIVE STATEMENT
51 year old F patient, A+OX3, ambulatory w steady gait presents to ED w wound draining serosangious drainage.  Denies fever/chills.  NO distress noted.   States 1 month ago she had an abscess, it "popped on its own", and then 4 days ago she noted it spreading.  Wound takes up entire calf.

## 2021-11-20 NOTE — H&P ADULT - NSHPPHYSICALEXAM_GEN_ALL_CORE
PHYSICAL EXAM  General: obese woman, NAD, sitting comfortably in bed  HEENT: NC/AT, PERRL/ EOMI, no scleral icterus, MMM, good dentition  Neck: Supple; no JVD  Respiratory: CTA B/L, no wheezes/crackles   Cardiovascular: Regular rhythm/rate; +S1 +S2  Gastrointestinal: obese abdomen, soft, NTND, normoactive BS, no rebound, no guarding, no suprapubic tenderness  Extremities: WWP, 1+ bilateral lower extremity edema, no cyanosis, no clubbing, pulses equal  Neurological: A&Ox3, CNII-XII grossly intact, no obvious focal deficits, follows commands  Skin: Right lower extremity - mild erythema with some fluctuance and tenderness extending from posterior calve to achilles region, two [id number] purulent ulcers and one 3cm x1 cm circumferential nonpurulent ulcer

## 2021-11-20 NOTE — H&P ADULT - PROBLEM SELECTOR PLAN 1
Prior history of RLE wound infection requiring bactrim this past May-June 2021. No other reports of cellulitis. Presenting with one week of worsening purulence/drainage in posterior calve region and associated with burning sensation. No recent trauma/falls.   - CT RLE: soft tissue demonstrates moderate diffuse induration of the soft tissue extending from the lower right thigh through the right calf and ankle. Differential includes edema; cannot exclude cellulitis. This is slightly more prominent on the lateral aspect of the right calf compared to left. There is no evidence of any fluid encased lesions suggestive of mature abscess. No evidence soft tissue air.  - c/w vancomycin 1g x3 doses   - f/up blood cultures  - wound care consult Prior history of RLE wound infection requiring bactrim this past May-June 2021. No other reports of cellulitis. Presenting with one week of worsening purulence/drainage in posterior calve region and associated with burning sensation. No recent trauma/falls.   - CT RLE: soft tissue demonstrates moderate diffuse induration of the soft tissue extending from the lower right thigh through the right calf and ankle. Differential includes edema; cannot exclude cellulitis. This is slightly more prominent on the lateral aspect of the right calf compared to left. There is no evidence of any fluid encased lesions suggestive of mature abscess. No evidence soft tissue air.  - c/w vancomycin 1g x3 doses and zosyn 3.375g q6h. Can consider de-escalation of zosyn if A1c within normal limits.   - f/up blood cultures  - f/up wound culture  - wound care consult Prior history of RLE wound infection requiring bactrim this past May-June 2021. No other reports of cellulitis. Presenting with one week of worsening purulence/drainage in posterior calve region and associated with burning sensation. No recent trauma/falls.   - CT RLE: soft tissue demonstrates moderate diffuse induration of the soft tissue extending from the lower right thigh through the right calf and ankle. Differential includes edema; cannot exclude cellulitis. This is slightly more prominent on the lateral aspect of the right calf compared to left. There is no evidence of any fluid encased lesions suggestive of mature abscess. No evidence soft tissue air.  - c/w vancomycin 1g x3 doses and zosyn 3.375g q6h. Can consider de-escalation of zosyn if A1c within normal limits.   - f/up blood cultures  - f/up wound culture  - LE dopplers to r/o DVT  - wound care consult Prior history of RLE wound infection requiring bactrim this past May-June 2021. No other reports of cellulitis. Presenting with one week of worsening purulence/drainage in posterior calve region and associated with burning sensation. No recent trauma/falls.   - CT RLE: soft tissue demonstrates moderate diffuse induration of the soft tissue extending from the lower right thigh through the right calf and ankle. Differential includes edema; cannot exclude cellulitis. This is slightly more prominent on the lateral aspect of the right calf compared to left. There is no evidence of any fluid encased lesions suggestive of mature abscess. No evidence soft tissue air.  - c/w vancomycin 1g x3 doses. Would hold off on zosyn at this time. Next trough due 11/22 @2am  - f/up blood cultures  - f/up wound culture  - LE dopplers to r/o DVT  - wound care consult Prior history of RLE wound infection requiring bactrim this past May-June 2021. No other reports of cellulitis. Presenting with one week of worsening purulence/drainage in posterior calve region and associated with burning sensation. No recent trauma/falls. s/p vanc and zosyn in the ED.   - CT RLE: soft tissue demonstrates moderate diffuse induration of the soft tissue extending from the lower right thigh through the right calf and ankle. Differential includes edema; cannot exclude cellulitis. This is slightly more prominent on the lateral aspect of the right calf compared to left. There is no evidence of any fluid encased lesions suggestive of mature abscess. No evidence soft tissue air.  - c/w vancomycin 1g x3 doses. Would hold off on zosyn at this time. Next trough due 11/22 @2am  - f/up blood cultures  - f/up wound culture  - LE dopplers to r/o DVT  - wound care consult

## 2021-11-20 NOTE — H&P ADULT - ASSESSMENT
52 y/o Female with PMHx hydradenitis suppurativa (not on home medications) who presents with worsening right lower extremity wound for the past week. Admitted for purulent RLE cellulitis.

## 2021-11-20 NOTE — ED PROVIDER NOTE - OBJECTIVE STATEMENT
50 y/o F pt, who is a poor historian, w/ PMHx of R-sided lower extremity, posterior wound that began as a small abscess in September 2021, admitted to [facility], treated with IV antibiotics and abscess went away, presents to the ED c/o abscess returning with pain and swelling to the R lower extremity this past week. States that she cannot walk due to the wound. Patient states that she had an ultrasound to r/o DVT and CAT scan of the right lower extremity that were negative. Denies fevers, and chills. Patient cannot recall any trauma or as to how the wound happened.

## 2021-11-20 NOTE — H&P ADULT - PROBLEM SELECTOR PLAN 2
Known history for which patient believes she was on prophylactic antibiotics, although cannot recall names. Unclear if her cellulitis is a flare up of uncontrolled HS.   - will need close outpatient follow-up

## 2021-11-20 NOTE — H&P ADULT - PROBLEM SELECTOR PLAN 6
F: tolerating PO, no IVF  E: replete K<4, Mg<2  N: regular    VTE Prophylaxis: None  GI: not needed  C: Full Code  D: RMF Calculated BMI 28.9   - needs lifestyle education  - f/up A1c. If newly diagnosed diabetes, patient at risk for pseudomonal infection of the RLE

## 2021-11-20 NOTE — H&P ADULT - ATTENDING COMMENTS
#Purulent Cellilitis: RLE; recurrent infections in setting of hydradenitis. No signs of nec fasc, CT LE w/o gas or abcess. Currently afebrile, not septic. c/w vanc for purulent cellulitis. Wound cultures collected- based cultures, consider gram neg coverage.

## 2021-11-20 NOTE — H&P ADULT - PROBLEM SELECTOR PLAN 4
/91. Possible elevation secondary to RLE pain and discomfort. Per [facility] records, she was noted to have elevated blood pressures and recommended BP cuff for ambulatory monitoring.   - continue to monitor. If persistently elevated may benefit from PO med upon discharge.

## 2021-11-20 NOTE — H&P ADULT - NSHPLABSRESULTS_GEN_ALL_CORE
.  LABS                        10.6   8.70  )-----------( 374      ( 20 Nov 2021 15:09 )             33.7     11-20    139  |  105  |  8   ----------------------------<  78  3.9   |  26  |  0.75    Ca    9.3      20 Nov 2021 15:09    TPro  8.5<H>  /  Alb  4.0  /  TBili  0.2  /  DBili  x   /  AST  80<H>  /  ALT  75<H>  /  AlkPhos  103  11-20              RADIOLOGY & ADDITIONAL TESTS: Reviewed

## 2021-11-20 NOTE — H&P ADULT - NSHPSOCIALHISTORY_GEN_ALL_CORE
Alcohol - drinks 1-2 glasses of wine per week. Denies recreational drug use and tobacco product use. Lives in the [place].

## 2021-11-21 LAB
A1C WITH ESTIMATED AVERAGE GLUCOSE RESULT: 5.8 % — HIGH (ref 4–5.6)
ALBUMIN SERPL ELPH-MCNC: 3.8 G/DL — SIGNIFICANT CHANGE UP (ref 3.3–5)
ALP SERPL-CCNC: 104 U/L — SIGNIFICANT CHANGE UP (ref 40–120)
ALT FLD-CCNC: 63 U/L — HIGH (ref 10–45)
ANION GAP SERPL CALC-SCNC: 11 MMOL/L — SIGNIFICANT CHANGE UP (ref 5–17)
AST SERPL-CCNC: 42 U/L — HIGH (ref 10–40)
BASOPHILS # BLD AUTO: 0.05 K/UL — SIGNIFICANT CHANGE UP (ref 0–0.2)
BASOPHILS NFR BLD AUTO: 0.5 % — SIGNIFICANT CHANGE UP (ref 0–2)
BILIRUB SERPL-MCNC: 0.2 MG/DL — SIGNIFICANT CHANGE UP (ref 0.2–1.2)
BUN SERPL-MCNC: 9 MG/DL — SIGNIFICANT CHANGE UP (ref 7–23)
CALCIUM SERPL-MCNC: 9.7 MG/DL — SIGNIFICANT CHANGE UP (ref 8.4–10.5)
CHLORIDE SERPL-SCNC: 103 MMOL/L — SIGNIFICANT CHANGE UP (ref 96–108)
CO2 SERPL-SCNC: 26 MMOL/L — SIGNIFICANT CHANGE UP (ref 22–31)
CREAT SERPL-MCNC: 0.73 MG/DL — SIGNIFICANT CHANGE UP (ref 0.5–1.3)
EOSINOPHIL # BLD AUTO: 0.19 K/UL — SIGNIFICANT CHANGE UP (ref 0–0.5)
EOSINOPHIL NFR BLD AUTO: 2 % — SIGNIFICANT CHANGE UP (ref 0–6)
ESTIMATED AVERAGE GLUCOSE: 120 MG/DL — HIGH (ref 68–114)
FERRITIN SERPL-MCNC: 82 NG/ML — SIGNIFICANT CHANGE UP (ref 15–150)
GLUCOSE SERPL-MCNC: 89 MG/DL — SIGNIFICANT CHANGE UP (ref 70–99)
GRAM STN FLD: SIGNIFICANT CHANGE UP
HCT VFR BLD CALC: 34.7 % — SIGNIFICANT CHANGE UP (ref 34.5–45)
HGB BLD-MCNC: 11 G/DL — LOW (ref 11.5–15.5)
IMM GRANULOCYTES NFR BLD AUTO: 0.4 % — SIGNIFICANT CHANGE UP (ref 0–1.5)
IRON SATN MFR SERPL: 13 % — LOW (ref 14–50)
IRON SATN MFR SERPL: 36 UG/DL — SIGNIFICANT CHANGE UP (ref 30–160)
LYMPHOCYTES # BLD AUTO: 1.3 K/UL — SIGNIFICANT CHANGE UP (ref 1–3.3)
LYMPHOCYTES # BLD AUTO: 14 % — SIGNIFICANT CHANGE UP (ref 13–44)
MAGNESIUM SERPL-MCNC: 2 MG/DL — SIGNIFICANT CHANGE UP (ref 1.6–2.6)
MCHC RBC-ENTMCNC: 29.7 PG — SIGNIFICANT CHANGE UP (ref 27–34)
MCHC RBC-ENTMCNC: 31.7 GM/DL — LOW (ref 32–36)
MCV RBC AUTO: 93.8 FL — SIGNIFICANT CHANGE UP (ref 80–100)
MONOCYTES # BLD AUTO: 0.5 K/UL — SIGNIFICANT CHANGE UP (ref 0–0.9)
MONOCYTES NFR BLD AUTO: 5.4 % — SIGNIFICANT CHANGE UP (ref 2–14)
NEUTROPHILS # BLD AUTO: 7.22 K/UL — SIGNIFICANT CHANGE UP (ref 1.8–7.4)
NEUTROPHILS NFR BLD AUTO: 77.7 % — HIGH (ref 43–77)
NRBC # BLD: 0 /100 WBCS — SIGNIFICANT CHANGE UP (ref 0–0)
PHOSPHATE SERPL-MCNC: 3.3 MG/DL — SIGNIFICANT CHANGE UP (ref 2.5–4.5)
PLATELET # BLD AUTO: 386 K/UL — SIGNIFICANT CHANGE UP (ref 150–400)
POTASSIUM SERPL-MCNC: 3.6 MMOL/L — SIGNIFICANT CHANGE UP (ref 3.5–5.3)
POTASSIUM SERPL-SCNC: 3.6 MMOL/L — SIGNIFICANT CHANGE UP (ref 3.5–5.3)
PROT SERPL-MCNC: 8.9 G/DL — HIGH (ref 6–8.3)
RBC # BLD: 3.7 M/UL — LOW (ref 3.8–5.2)
RBC # FLD: 13.7 % — SIGNIFICANT CHANGE UP (ref 10.3–14.5)
SODIUM SERPL-SCNC: 140 MMOL/L — SIGNIFICANT CHANGE UP (ref 135–145)
SPECIMEN SOURCE: SIGNIFICANT CHANGE UP
TIBC SERPL-MCNC: 283 UG/DL — SIGNIFICANT CHANGE UP (ref 220–430)
UIBC SERPL-MCNC: 247 UG/DL — SIGNIFICANT CHANGE UP (ref 110–370)
WBC # BLD: 9.3 K/UL — SIGNIFICANT CHANGE UP (ref 3.8–10.5)
WBC # FLD AUTO: 9.3 K/UL — SIGNIFICANT CHANGE UP (ref 3.8–10.5)

## 2021-11-21 PROCEDURE — 99233 SBSQ HOSP IP/OBS HIGH 50: CPT

## 2021-11-21 RX ADMIN — Medication 650 MILLIGRAM(S): at 16:11

## 2021-11-21 RX ADMIN — Medication 650 MILLIGRAM(S): at 03:06

## 2021-11-21 RX ADMIN — Medication 650 MILLIGRAM(S): at 22:30

## 2021-11-21 RX ADMIN — Medication 650 MILLIGRAM(S): at 21:50

## 2021-11-21 RX ADMIN — Medication 3 MILLIGRAM(S): at 21:51

## 2021-11-21 RX ADMIN — Medication 250 MILLIGRAM(S): at 06:09

## 2021-11-21 RX ADMIN — Medication 250 MILLIGRAM(S): at 17:36

## 2021-11-21 RX ADMIN — Medication 650 MILLIGRAM(S): at 15:21

## 2021-11-21 NOTE — PROGRESS NOTE ADULT - SUBJECTIVE AND OBJECTIVE BOX
SUBJECTIVE/OVERNIGHT EVENTS: No acute overnight events. Pt seen in AM at bedside, resting comfortably in bed, and does not appear to be in any acute distress. When asked, pt denies any recent or active fever, chills, nausea, vomiting, headache, acute sob, chest pain, abdominal pain, genitourinary sx, extremity pain or swelling.    VITAL SIGNS:  Vital Signs Last 24 Hrs  T(C): 36.8 (21 Nov 2021 05:53), Max: 36.8 (21 Nov 2021 05:53)  T(F): 98.3 (21 Nov 2021 05:53), Max: 98.3 (21 Nov 2021 05:53)  HR: 81 (21 Nov 2021 05:53) (67 - 85)  BP: 152/88 (21 Nov 2021 05:53) (145/93 - 157/91)  BP(mean): --  RR: 19 (21 Nov 2021 05:53) (18 - 20)  SpO2: 95% (21 Nov 2021 05:53) (95% - 99%)    PHYSICAL EXAM:  General: NAD; speaking in full sentences  HEENT: NC/AT; PERRL; EOMI; MMM  Neck: supple; no JVD  Cardiac: RRR; +S1/S2  Pulm: CTA B/L; no W/R/R  GI: soft, NT/ND, +BS  Extremities: WWP; no edema, clubbing or cyanosis, RLE purulent cellulitis  Vasc: 2+ radial, DP pulses B/L  Neuro: AAOx3; no focal deficits    MEDICATIONS:  MEDICATIONS  (STANDING):  influenza   Vaccine 0.5 milliLiter(s) IntraMuscular once  vancomycin  IVPB 1000 milliGRAM(s) IV Intermittent every 12 hours    MEDICATIONS  (PRN):  acetaminophen     Tablet .. 650 milliGRAM(s) Oral every 6 hours PRN Temp greater or equal to 38C (100.4F), Mild Pain (1 - 3)  melatonin 3 milliGRAM(s) Oral at bedtime PRN Insomnia      ALLERGIES:  Allergies    No Known Allergies    Intolerances        LABS:                        10.6   8.70  )-----------( 374      ( 20 Nov 2021 15:09 )             33.7     11-20    139  |  105  |  8   ----------------------------<  78  3.9   |  26  |  0.75    Ca    9.3      20 Nov 2021 15:09    TPro  8.5<H>  /  Alb  4.0  /  TBili  0.2  /  DBili  x   /  AST  80<H>  /  ALT  75<H>  /  AlkPhos  103  11-20        RADIOLOGY & ADDITIONAL TESTS: Reviewed.

## 2021-11-22 LAB
ANION GAP SERPL CALC-SCNC: 7 MMOL/L — SIGNIFICANT CHANGE UP (ref 5–17)
APPEARANCE UR: CLEAR — SIGNIFICANT CHANGE UP
BASOPHILS # BLD AUTO: 0.05 K/UL — SIGNIFICANT CHANGE UP (ref 0–0.2)
BASOPHILS NFR BLD AUTO: 0.6 % — SIGNIFICANT CHANGE UP (ref 0–2)
BILIRUB UR-MCNC: NEGATIVE — SIGNIFICANT CHANGE UP
BUN SERPL-MCNC: 9 MG/DL — SIGNIFICANT CHANGE UP (ref 7–23)
CALCIUM SERPL-MCNC: 10 MG/DL — SIGNIFICANT CHANGE UP (ref 8.4–10.5)
CHLORIDE SERPL-SCNC: 103 MMOL/L — SIGNIFICANT CHANGE UP (ref 96–108)
CO2 SERPL-SCNC: 31 MMOL/L — SIGNIFICANT CHANGE UP (ref 22–31)
COLOR SPEC: YELLOW — SIGNIFICANT CHANGE UP
COVID-19 NUCLEOCAPSID GAM AB INTERP: NEGATIVE — SIGNIFICANT CHANGE UP
COVID-19 NUCLEOCAPSID TOTAL GAM ANTIBODY RESULT: 0.08 INDEX — SIGNIFICANT CHANGE UP
COVID-19 SPIKE DOMAIN AB INTERP: POSITIVE
COVID-19 SPIKE DOMAIN ANTIBODY RESULT: >250 U/ML — HIGH
CREAT SERPL-MCNC: 0.74 MG/DL — SIGNIFICANT CHANGE UP (ref 0.5–1.3)
DIFF PNL FLD: NEGATIVE — SIGNIFICANT CHANGE UP
EOSINOPHIL # BLD AUTO: 0.11 K/UL — SIGNIFICANT CHANGE UP (ref 0–0.5)
EOSINOPHIL NFR BLD AUTO: 1.3 % — SIGNIFICANT CHANGE UP (ref 0–6)
GLUCOSE SERPL-MCNC: 79 MG/DL — SIGNIFICANT CHANGE UP (ref 70–99)
GLUCOSE UR QL: NEGATIVE — SIGNIFICANT CHANGE UP
HCT VFR BLD CALC: 34.4 % — LOW (ref 34.5–45)
HGB BLD-MCNC: 11.1 G/DL — LOW (ref 11.5–15.5)
IMM GRANULOCYTES NFR BLD AUTO: 0.5 % — SIGNIFICANT CHANGE UP (ref 0–1.5)
KETONES UR-MCNC: NEGATIVE — SIGNIFICANT CHANGE UP
LEUKOCYTE ESTERASE UR-ACNC: NEGATIVE — SIGNIFICANT CHANGE UP
LYMPHOCYTES # BLD AUTO: 1.42 K/UL — SIGNIFICANT CHANGE UP (ref 1–3.3)
LYMPHOCYTES # BLD AUTO: 16.3 % — SIGNIFICANT CHANGE UP (ref 13–44)
MAGNESIUM SERPL-MCNC: 2.1 MG/DL — SIGNIFICANT CHANGE UP (ref 1.6–2.6)
MCHC RBC-ENTMCNC: 29.7 PG — SIGNIFICANT CHANGE UP (ref 27–34)
MCHC RBC-ENTMCNC: 32.3 GM/DL — SIGNIFICANT CHANGE UP (ref 32–36)
MCV RBC AUTO: 92 FL — SIGNIFICANT CHANGE UP (ref 80–100)
MONOCYTES # BLD AUTO: 0.53 K/UL — SIGNIFICANT CHANGE UP (ref 0–0.9)
MONOCYTES NFR BLD AUTO: 6.1 % — SIGNIFICANT CHANGE UP (ref 2–14)
NEUTROPHILS # BLD AUTO: 6.54 K/UL — SIGNIFICANT CHANGE UP (ref 1.8–7.4)
NEUTROPHILS NFR BLD AUTO: 75.2 % — SIGNIFICANT CHANGE UP (ref 43–77)
NITRITE UR-MCNC: NEGATIVE — SIGNIFICANT CHANGE UP
NRBC # BLD: 0 /100 WBCS — SIGNIFICANT CHANGE UP (ref 0–0)
PH UR: 6 — SIGNIFICANT CHANGE UP (ref 5–8)
PHOSPHATE SERPL-MCNC: 3.7 MG/DL — SIGNIFICANT CHANGE UP (ref 2.5–4.5)
PLATELET # BLD AUTO: 373 K/UL — SIGNIFICANT CHANGE UP (ref 150–400)
POTASSIUM SERPL-MCNC: 3.7 MMOL/L — SIGNIFICANT CHANGE UP (ref 3.5–5.3)
POTASSIUM SERPL-SCNC: 3.7 MMOL/L — SIGNIFICANT CHANGE UP (ref 3.5–5.3)
PROT UR-MCNC: NEGATIVE MG/DL — SIGNIFICANT CHANGE UP
RBC # BLD: 3.74 M/UL — LOW (ref 3.8–5.2)
RBC # FLD: 13.6 % — SIGNIFICANT CHANGE UP (ref 10.3–14.5)
SARS-COV-2 IGG+IGM SERPL QL IA: 0.08 INDEX — SIGNIFICANT CHANGE UP
SARS-COV-2 IGG+IGM SERPL QL IA: >250 U/ML — HIGH
SARS-COV-2 IGG+IGM SERPL QL IA: NEGATIVE — SIGNIFICANT CHANGE UP
SARS-COV-2 IGG+IGM SERPL QL IA: POSITIVE
SODIUM SERPL-SCNC: 141 MMOL/L — SIGNIFICANT CHANGE UP (ref 135–145)
SP GR SPEC: 1.01 — SIGNIFICANT CHANGE UP (ref 1–1.03)
UROBILINOGEN FLD QL: 0.2 E.U./DL — SIGNIFICANT CHANGE UP
VANCOMYCIN TROUGH SERPL-MCNC: 8 UG/ML — LOW (ref 10–20)
WBC # BLD: 8.69 K/UL — SIGNIFICANT CHANGE UP (ref 3.8–10.5)
WBC # FLD AUTO: 8.69 K/UL — SIGNIFICANT CHANGE UP (ref 3.8–10.5)

## 2021-11-22 RX ORDER — POTASSIUM CHLORIDE 20 MEQ
10 PACKET (EA) ORAL DAILY
Refills: 0 | Status: DISCONTINUED | OUTPATIENT
Start: 2021-11-22 | End: 2021-11-23

## 2021-11-22 RX ORDER — VANCOMYCIN HCL 1 G
1500 VIAL (EA) INTRAVENOUS EVERY 12 HOURS
Refills: 0 | Status: COMPLETED | OUTPATIENT
Start: 2021-11-22 | End: 2021-11-23

## 2021-11-22 RX ORDER — LANOLIN ALCOHOL/MO/W.PET/CERES
3 CREAM (GRAM) TOPICAL ONCE
Refills: 0 | Status: COMPLETED | OUTPATIENT
Start: 2021-11-22 | End: 2021-11-22

## 2021-11-22 RX ORDER — FERROUS SULFATE 325(65) MG
325 TABLET ORAL DAILY
Refills: 0 | Status: DISCONTINUED | OUTPATIENT
Start: 2021-11-22 | End: 2021-11-23

## 2021-11-22 RX ORDER — POTASSIUM CHLORIDE 20 MEQ
20 PACKET (EA) ORAL ONCE
Refills: 0 | Status: COMPLETED | OUTPATIENT
Start: 2021-11-22 | End: 2021-11-22

## 2021-11-22 RX ADMIN — Medication 325 MILLIGRAM(S): at 13:02

## 2021-11-22 RX ADMIN — Medication 20 MILLIEQUIVALENT(S): at 13:02

## 2021-11-22 RX ADMIN — Medication 300 MILLIGRAM(S): at 07:31

## 2021-11-22 RX ADMIN — Medication 10 MILLIEQUIVALENT(S): at 14:32

## 2021-11-22 RX ADMIN — Medication 650 MILLIGRAM(S): at 15:52

## 2021-11-22 RX ADMIN — Medication 3 MILLIGRAM(S): at 10:43

## 2021-11-22 RX ADMIN — Medication 300 MILLIGRAM(S): at 22:38

## 2021-11-22 NOTE — PROGRESS NOTE ADULT - PROBLEM SELECTOR PLAN 1
RLE extremity i/s/o reccurent hydradenitis  - Wound cx + for polymicrobial growth (pseudomonas, cornyebacterium, GBS). No leukocytosis, pt efebrile. CT negative for abscess.    - c/w vancomycin 1g for 1 more day. next trough at 2AM  - Possible transition to ceftriaxone tomorrow pending clinical improvement  - Wound care reccs derm consult for hydradenitis
Prior history of RLE wound infection requiring bactrim this past May-June 2021. No other reports of cellulitis. Presenting with one week of worsening purulence/drainage in posterior calve region and associated with burning sensation. No recent trauma/falls. s/p vanc and zosyn in the ED.   - CT RLE: soft tissue demonstrates moderate diffuse induration of the soft tissue extending from the lower right thigh through the right calf and ankle. Differential includes edema; cannot exclude cellulitis. This is slightly more prominent on the lateral aspect of the right calf compared to left. There is no evidence of any fluid encased lesions suggestive of mature abscess. No evidence soft tissue air.  - c/w vancomycin 1g x3 doses. Would hold off on zosyn at this time. Next trough due 11/22 @2am  - f/up blood cultures  - f/up wound culture  - LE dopplers to r/o DVT  - wound care consult

## 2021-11-22 NOTE — PROGRESS NOTE ADULT - PROBLEM SELECTOR PLAN 5
Hgb 10.6/Hct 33.7. Denies any reports of melena and bloody bowel movements.   - f/up iron panel in AM  - active T&S  - transfuse for Hgb < 7
Iron panel suggestive of HARRY (low ferritin, low % sat)  - started PO iron 325 Qd   - active T&S  - transfuse for Hgb < 7

## 2021-11-22 NOTE — PROGRESS NOTE ADULT - PROBLEM SELECTOR PLAN 2
Known history for which patient believes she was on prophylactic antibiotics, although cannot recall names. Unclear if her cellulitis is a flare up of uncontrolled HS.   - will need close outpatient follow-up
Known history for which patient believes she was on prophylactic antibiotics, although cannot recall names. Unclear if her cellulitis is a flare up of uncontrolled HS.   - will need close outpatient follow-up  - consider derm consult in AM

## 2021-11-22 NOTE — PROGRESS NOTE ADULT - PROBLEM SELECTOR PLAN 7
F: tolerating PO, no IVF  E: replete K<4, Mg<2  N: regular    VTE Prophylaxis: None  GI: not needed  C: Full Code  D: RMF
F: tolerating PO, no IVF  E: replete K<4, Mg<2  N: regular    VTE Prophylaxis: None  GI: not needed  C: Full Code  D: RMF

## 2021-11-22 NOTE — PROGRESS NOTE ADULT - SUBJECTIVE AND OBJECTIVE BOX
OVERNIGHT EVENTS: urinated 3 times overnight, had one episode of urinary incontinence    SUBJECTIVE:  Patient seen and examined at bedside. Denies abdominal pain, fevers, chills, dysuria or hematuria. States her leg feels better.    Vital Signs Last 12 Hrs  T(F): 98.2 (21 @ 08:40), Max: 98.5 (21 @ 05:40)  HR: 85 (21 @ 08:40) (85 - 101)  BP: 155/97 (21 @ 08:40) (155/97 - 161/89)  BP(mean): --  RR: 18 (21 @ 08:40) (18 - 18)  SpO2: 98% (21 @ 08:40) (95% - 98%)  I&O's Summary      PHYSICAL EXAM:  General: NAD; speaking in full sentences  HEENT: NC/AT; PERRL; EOMI; MMM  Neck: supple; no JVD  Cardiac: RRR; +S1/S2  Pulm: CTA B/L; no W/R/R  GI: soft, NT/ND, +BS  Extremities: WWP; no edema, clubbing or cyanosis, RLE is bandaged, warm and edematous, non tender, bandage is adherent to skin  Vasc: 2+ radial, DP pulses B/L  Neuro: AAOx3; no focal deficits        LABS:                        11.1   8.69  )-----------( 373      ( 2021 07:53 )             34.4         141  |  103  |  9   ----------------------------<  79  3.7   |  31  |  0.74    Ca    10.0      2021 07:53  Phos  3.7       Mg     2.1         TPro  8.9<H>  /  Alb  3.8  /  TBili  0.2  /  DBili  x   /  AST  42<H>  /  ALT  63<H>  /  AlkPhos  104        Urinalysis Basic - ( 2021 13:25 )    Color: Yellow / Appearance: Clear / S.010 / pH: x  Gluc: x / Ketone: NEGATIVE  / Bili: Negative / Urobili: 0.2 E.U./dL   Blood: x / Protein: NEGATIVE mg/dL / Nitrite: NEGATIVE   Leuk Esterase: NEGATIVE / RBC: x / WBC x   Sq Epi: x / Non Sq Epi: x / Bacteria: x          RADIOLOGY & ADDITIONAL TESTS:    MEDICATIONS  (STANDING):  ferrous    sulfate 325 milliGRAM(s) Oral daily  influenza   Vaccine 0.5 milliLiter(s) IntraMuscular once  potassium chloride    Tablet ER 10 milliEquivalent(s) Oral daily  vancomycin  IVPB 1500 milliGRAM(s) IV Intermittent every 12 hours    MEDICATIONS  (PRN):  acetaminophen     Tablet .. 650 milliGRAM(s) Oral every 6 hours PRN Temp greater or equal to 38C (100.4F), Mild Pain (1 - 3)  melatonin 3 milliGRAM(s) Oral at bedtime PRN Insomnia

## 2021-11-22 NOTE — PROGRESS NOTE ADULT - PROBLEM SELECTOR PLAN 6
Calculated BMI 28.9. A1c 5.8. Prediabetes.  - needs lifestyle education
Calculated BMI 28.9. A1c 5.8. Prediabetes.  - needs lifestyle education

## 2021-11-22 NOTE — PROGRESS NOTE ADULT - PROBLEM SELECTOR PLAN 3
AST 80/ALT 75 on arrival. Denies any abdominal pain, nausea, and vomiting.   - continue to monitor
AST 80/ALT 75 on arrival. Denies any abdominal pain, nausea, and vomiting.   - continue to monitor

## 2021-11-22 NOTE — PROGRESS NOTE ADULT - ASSESSMENT
52 y/o Female with PMHx hydradenitis suppurativa (not on home medications) who presents with worsening right lower extremity wound for the past week. Admitted for purulent RLE cellulitis. 
52 y/o Female with PMHx hydradenitis suppurativa (not on home medications) who presents with worsening right lower extremity wound for the past week. Admitted for purulent RLE cellulitis.

## 2021-11-22 NOTE — PROGRESS NOTE ADULT - PROBLEM SELECTOR PLAN 4
/91. Possible elevation secondary to RLE pain and discomfort. Per [facility] records, she was noted to have elevated blood pressures and recommended BP cuff for ambulatory monitoring.   - continue to monitor. If persistently elevated may benefit from PO med upon discharge.
BP elevated to SBP 150s  - likely 2/2 undiagnosed HTN vs pain from RLE  - Plan to start lisinopril 2.5mg today but pt is unwilling currently   - Monitor BP, consider starting Rx in AM  - will require BP OP follow

## 2021-11-23 ENCOUNTER — TRANSCRIPTION ENCOUNTER (OUTPATIENT)
Age: 51
End: 2021-11-23

## 2021-11-23 ENCOUNTER — APPOINTMENT (OUTPATIENT)
Dept: INTERNAL MEDICINE | Facility: CLINIC | Age: 51
End: 2021-11-23

## 2021-11-23 VITALS
OXYGEN SATURATION: 98 % | SYSTOLIC BLOOD PRESSURE: 165 MMHG | HEART RATE: 80 BPM | DIASTOLIC BLOOD PRESSURE: 95 MMHG | TEMPERATURE: 99 F | RESPIRATION RATE: 18 BRPM

## 2021-11-23 LAB
-  AZTREONAM: SIGNIFICANT CHANGE UP
-  CEFEPIME: SIGNIFICANT CHANGE UP
-  CIPROFLOXACIN: SIGNIFICANT CHANGE UP
-  CLINDAMYCIN: SIGNIFICANT CHANGE UP
-  ERYTHROMYCIN: SIGNIFICANT CHANGE UP
-  GENTAMICIN: SIGNIFICANT CHANGE UP
-  LEVOFLOXACIN: SIGNIFICANT CHANGE UP
-  PENICILLIN: SIGNIFICANT CHANGE UP
-  PIPERACILLIN/TAZOBACTAM: SIGNIFICANT CHANGE UP
-  TOBRAMYCIN: SIGNIFICANT CHANGE UP
-  VANCOMYCIN: SIGNIFICANT CHANGE UP
ALBUMIN SERPL ELPH-MCNC: 3.4 G/DL — SIGNIFICANT CHANGE UP (ref 3.3–5)
ALP SERPL-CCNC: 81 U/L — SIGNIFICANT CHANGE UP (ref 40–120)
ALT FLD-CCNC: 37 U/L — SIGNIFICANT CHANGE UP (ref 10–45)
ANION GAP SERPL CALC-SCNC: 8 MMOL/L — SIGNIFICANT CHANGE UP (ref 5–17)
AST SERPL-CCNC: 20 U/L — SIGNIFICANT CHANGE UP (ref 10–40)
BASOPHILS # BLD AUTO: 0.03 K/UL — SIGNIFICANT CHANGE UP (ref 0–0.2)
BASOPHILS NFR BLD AUTO: 0.4 % — SIGNIFICANT CHANGE UP (ref 0–2)
BILIRUB SERPL-MCNC: 0.3 MG/DL — SIGNIFICANT CHANGE UP (ref 0.2–1.2)
BUN SERPL-MCNC: 10 MG/DL — SIGNIFICANT CHANGE UP (ref 7–23)
CALCIUM SERPL-MCNC: 9.6 MG/DL — SIGNIFICANT CHANGE UP (ref 8.4–10.5)
CHLORIDE SERPL-SCNC: 103 MMOL/L — SIGNIFICANT CHANGE UP (ref 96–108)
CO2 SERPL-SCNC: 27 MMOL/L — SIGNIFICANT CHANGE UP (ref 22–31)
CREAT SERPL-MCNC: 0.68 MG/DL — SIGNIFICANT CHANGE UP (ref 0.5–1.3)
EOSINOPHIL # BLD AUTO: 0.15 K/UL — SIGNIFICANT CHANGE UP (ref 0–0.5)
EOSINOPHIL NFR BLD AUTO: 2.1 % — SIGNIFICANT CHANGE UP (ref 0–6)
GLUCOSE SERPL-MCNC: 96 MG/DL — SIGNIFICANT CHANGE UP (ref 70–99)
HCT VFR BLD CALC: 33.6 % — LOW (ref 34.5–45)
HGB BLD-MCNC: 11 G/DL — LOW (ref 11.5–15.5)
IMM GRANULOCYTES NFR BLD AUTO: 0.4 % — SIGNIFICANT CHANGE UP (ref 0–1.5)
LYMPHOCYTES # BLD AUTO: 1.51 K/UL — SIGNIFICANT CHANGE UP (ref 1–3.3)
LYMPHOCYTES # BLD AUTO: 21.3 % — SIGNIFICANT CHANGE UP (ref 13–44)
MAGNESIUM SERPL-MCNC: 2 MG/DL — SIGNIFICANT CHANGE UP (ref 1.6–2.6)
MCHC RBC-ENTMCNC: 29.9 PG — SIGNIFICANT CHANGE UP (ref 27–34)
MCHC RBC-ENTMCNC: 32.7 GM/DL — SIGNIFICANT CHANGE UP (ref 32–36)
MCV RBC AUTO: 91.3 FL — SIGNIFICANT CHANGE UP (ref 80–100)
METHOD TYPE: SIGNIFICANT CHANGE UP
MONOCYTES # BLD AUTO: 0.61 K/UL — SIGNIFICANT CHANGE UP (ref 0–0.9)
MONOCYTES NFR BLD AUTO: 8.6 % — SIGNIFICANT CHANGE UP (ref 2–14)
NEUTROPHILS # BLD AUTO: 4.77 K/UL — SIGNIFICANT CHANGE UP (ref 1.8–7.4)
NEUTROPHILS NFR BLD AUTO: 67.2 % — SIGNIFICANT CHANGE UP (ref 43–77)
NRBC # BLD: 0 /100 WBCS — SIGNIFICANT CHANGE UP (ref 0–0)
PHOSPHATE SERPL-MCNC: 3.8 MG/DL — SIGNIFICANT CHANGE UP (ref 2.5–4.5)
PLATELET # BLD AUTO: 356 K/UL — SIGNIFICANT CHANGE UP (ref 150–400)
POTASSIUM SERPL-MCNC: 3.9 MMOL/L — SIGNIFICANT CHANGE UP (ref 3.5–5.3)
POTASSIUM SERPL-SCNC: 3.9 MMOL/L — SIGNIFICANT CHANGE UP (ref 3.5–5.3)
PROT SERPL-MCNC: 7.9 G/DL — SIGNIFICANT CHANGE UP (ref 6–8.3)
RBC # BLD: 3.68 M/UL — LOW (ref 3.8–5.2)
RBC # FLD: 13.7 % — SIGNIFICANT CHANGE UP (ref 10.3–14.5)
SODIUM SERPL-SCNC: 138 MMOL/L — SIGNIFICANT CHANGE UP (ref 135–145)
WBC # BLD: 7.1 K/UL — SIGNIFICANT CHANGE UP (ref 3.8–10.5)
WBC # FLD AUTO: 7.1 K/UL — SIGNIFICANT CHANGE UP (ref 3.8–10.5)

## 2021-11-23 PROCEDURE — 99285 EMERGENCY DEPT VISIT HI MDM: CPT | Mod: 25

## 2021-11-23 PROCEDURE — 96374 THER/PROPH/DIAG INJ IV PUSH: CPT

## 2021-11-23 PROCEDURE — 87070 CULTURE OTHR SPECIMN AEROBIC: CPT

## 2021-11-23 PROCEDURE — 36415 COLL VENOUS BLD VENIPUNCTURE: CPT

## 2021-11-23 PROCEDURE — 99239 HOSP IP/OBS DSCHRG MGMT >30: CPT | Mod: GC

## 2021-11-23 PROCEDURE — 87040 BLOOD CULTURE FOR BACTERIA: CPT

## 2021-11-23 PROCEDURE — 86769 SARS-COV-2 COVID-19 ANTIBODY: CPT

## 2021-11-23 PROCEDURE — 96375 TX/PRO/DX INJ NEW DRUG ADDON: CPT

## 2021-11-23 PROCEDURE — 82728 ASSAY OF FERRITIN: CPT

## 2021-11-23 PROCEDURE — 80202 ASSAY OF VANCOMYCIN: CPT

## 2021-11-23 PROCEDURE — 83735 ASSAY OF MAGNESIUM: CPT

## 2021-11-23 PROCEDURE — 83550 IRON BINDING TEST: CPT

## 2021-11-23 PROCEDURE — 73701 CT LOWER EXTREMITY W/DYE: CPT | Mod: MC

## 2021-11-23 PROCEDURE — 87184 SC STD DISK METHOD PER PLATE: CPT

## 2021-11-23 PROCEDURE — 87181 SC STD AGAR DILUTION PER AGT: CPT

## 2021-11-23 PROCEDURE — 84100 ASSAY OF PHOSPHORUS: CPT

## 2021-11-23 PROCEDURE — 80048 BASIC METABOLIC PNL TOTAL CA: CPT

## 2021-11-23 PROCEDURE — 83540 ASSAY OF IRON: CPT

## 2021-11-23 PROCEDURE — 87186 SC STD MICRODIL/AGAR DIL: CPT

## 2021-11-23 PROCEDURE — 85025 COMPLETE CBC W/AUTO DIFF WBC: CPT

## 2021-11-23 PROCEDURE — 87075 CULTR BACTERIA EXCEPT BLOOD: CPT

## 2021-11-23 PROCEDURE — 83605 ASSAY OF LACTIC ACID: CPT

## 2021-11-23 PROCEDURE — 83036 HEMOGLOBIN GLYCOSYLATED A1C: CPT

## 2021-11-23 PROCEDURE — 87635 SARS-COV-2 COVID-19 AMP PRB: CPT

## 2021-11-23 PROCEDURE — 81003 URINALYSIS AUTO W/O SCOPE: CPT

## 2021-11-23 PROCEDURE — 80053 COMPREHEN METABOLIC PANEL: CPT

## 2021-11-23 RX ORDER — FERROUS SULFATE 325(65) MG
1 TABLET ORAL
Qty: 30 | Refills: 0
Start: 2021-11-23 | End: 2021-12-22

## 2021-11-23 RX ORDER — METHOCARBAMOL 500 MG/1
1 TABLET, FILM COATED ORAL
Qty: 90 | Refills: 0
Start: 2021-11-23 | End: 2021-12-22

## 2021-11-23 RX ORDER — POTASSIUM CHLORIDE 20 MEQ
10 PACKET (EA) ORAL ONCE
Refills: 0 | Status: COMPLETED | OUTPATIENT
Start: 2021-11-23 | End: 2021-11-23

## 2021-11-23 RX ORDER — METHOCARBAMOL 500 MG/1
750 TABLET, FILM COATED ORAL EVERY 8 HOURS
Refills: 0 | Status: DISCONTINUED | OUTPATIENT
Start: 2021-11-23 | End: 2021-11-23

## 2021-11-23 RX ORDER — LIDOCAINE 4 G/100G
1 CREAM TOPICAL ONCE
Refills: 0 | Status: COMPLETED | OUTPATIENT
Start: 2021-11-23 | End: 2021-11-23

## 2021-11-23 RX ORDER — METHOCARBAMOL 500 MG/1
2 TABLET, FILM COATED ORAL
Qty: 60 | Refills: 0
Start: 2021-11-23 | End: 2021-12-02

## 2021-11-23 RX ORDER — AMLODIPINE BESYLATE 2.5 MG/1
1 TABLET ORAL
Qty: 30 | Refills: 0
Start: 2021-11-23 | End: 2021-12-22

## 2021-11-23 RX ORDER — LIDOCAINE 4 G/100G
1 CREAM TOPICAL
Qty: 2 | Refills: 0
Start: 2021-11-23 | End: 2021-11-29

## 2021-11-23 RX ADMIN — Medication 10 MILLIEQUIVALENT(S): at 11:10

## 2021-11-23 RX ADMIN — Medication 10 MILLIEQUIVALENT(S): at 09:19

## 2021-11-23 RX ADMIN — Medication 650 MILLIGRAM(S): at 09:20

## 2021-11-23 RX ADMIN — Medication 3 MILLIGRAM(S): at 00:02

## 2021-11-23 RX ADMIN — LIDOCAINE 1 PATCH: 4 CREAM TOPICAL at 11:36

## 2021-11-23 RX ADMIN — Medication 325 MILLIGRAM(S): at 11:10

## 2021-11-23 RX ADMIN — Medication 650 MILLIGRAM(S): at 10:23

## 2021-11-23 RX ADMIN — METHOCARBAMOL 750 MILLIGRAM(S): 500 TABLET, FILM COATED ORAL at 11:35

## 2021-11-23 RX ADMIN — Medication 300 MILLIGRAM(S): at 10:02

## 2021-11-23 NOTE — DISCHARGE NOTE PROVIDER - NSDCMRMEDTOKEN_GEN_ALL_CORE_FT
amLODIPine 5 mg oral tablet: 1 tab(s) orally once a day   Aspercreme with Lidocaine 4% topical cream: Apply topically to affected area every 8 hours   doxycycline hyclate 50 mg oral tablet: 2 tab(s) orally 2 times a day   ferrous sulfate 325 mg (65 mg elemental iron) oral tablet: 1 tab(s) orally once a day  methocarbamol 750 mg oral tablet: 2 tab(s) orally every 8 hours    Allevyn Life foam dressin, apply to affected area as per wound care instructions.:   amLODIPine 5 mg oral tablet: 1 tab(s) orally once a day   Aspercreme with Lidocaine 4% topical cream: Apply topically to affected area every 8 hours   doxycycline hyclate 50 mg oral tablet: 2 tab(s) orally 2 times a day   ferrous sulfate 325 mg (65 mg elemental iron) oral tablet: 1 tab(s) orally once a day  Medihoney, apply to affected area as per wound care instructions.:   methocarbamol 750 mg oral tablet: 2 tab(s) orally every 8 hours

## 2021-11-23 NOTE — DISCHARGE NOTE PROVIDER - NSDCFUADDAPPT_GEN_ALL_CORE_FT
Please follow up with your dermatologist within 1 week of discharge Please follow up with your dermatologist within 1 week of discharge    Please follow up at [facility] clinic on 11/30/21 Tuesday at 2PM Please follow up with your dermatologist at [facility] within 1 week of discharge ([phone])    Please follow up at [facility] clinic at [address] [place] [address] 2nd floor on 11/30/21 Tuesday at 2PM Please follow up with your dermatologist at [facility] [address] [place] [address] On 11/24/21 Wednesday at 11AM. Please call [phone] for any questions.    Please follow up at [facility] clinic at [address] [place] [address] 2nd floor on 11/30/21 Tuesday at 2PM

## 2021-11-23 NOTE — PROGRESS NOTE ADULT - ATTENDING COMMENTS
DC note to follow    Reports some drainage/saturation of wound dressing, reports pain has improved but still present in calf area.    Phys Exam:    A/P:  1. RLE purulent cellulitis   2. recurrent hydradenitis suppurativa  3. transaminitis, resolved  4. essential HTN    - transition to doxycycline 100mg BID x 7 days from vancomycin IV; Surgical wound with polybacteria, which may suggest contamination; Gram stain with GNR,  -remains afebrile with normal WBC  - robaxin 750mg q8h and tylenol for pain control  - outpatient PCP and dermatology f/u  - wound care reccs appreciated re: dressing changes
Reports some drainage/saturation of wound dressing, reports pain has improved but still present in calf area.    Phys Exam:    A/P:  1. RLE purulent cellulitis in setting of recurrent hydradenitis  2. transaminitis, stable  3. essential HTN    - continue vancomycin IV; Surgical wound with polybacteria, which may suggest contamination; Gram stain with GNR, await S/S. May be able to de-escalate to ceftriaxone tomorrow. Continue routine wound care dressing changes.  - CT reviewed, with soft tissue edema of the RLE, negative for deep abscess/collection  - remains afebrile, no leukocytosis
Pt. seen and examined by me earlier today; I have read Dr. Smith's note, I agree w/ his findings and plan of care as documented; cont. abx, f/u cultures; need collateral from Pt.'s outpatient dermatologist

## 2021-11-23 NOTE — ADVANCED PRACTICE NURSE CONSULT - REASON FOR CONSULT
WOC nurse consult to assess for possible hydradenitis suppurativa. WOCN spoke with house staff and recommended a dermatology consult. 
WOC nurse consult to assess right lower extremity ulcer. The patient is a 50 y/o Female with PMHx hydradenitis suppurativa (not on home medications) who presented with worsening right lower extremity wound for the past week.

## 2021-11-23 NOTE — DISCHARGE NOTE PROVIDER - HOSPITAL COURSE
#Discharge: do not delete    Patient is __ yo M/F with past medical history of _____  Presented with _____, found to have _____  Problem List/Main Diagnoses (system-based):   Inpatient treatment course:   New medications:   Labs to be followed outpatient:   Exam to be followed outpatient:    #Discharge: do not delete    Patient is  50yo F with past medical history of hydradenitis suppurativa   Presented with RLE pain, found to have purulent cellulitis of the RLE  Problem List/Main Diagnoses (system-based):      Problem/Plan - 1:  ·  Problem: Cellulitis.   ·  Plan: RLE extremity   - Wound cx + for polymicrobial growth (pseudomonas, cornyebacterium, GBS). No leukocytosis, pt efebrile. CT negative for abscess.    - s/p IV vancomycin with improvment  - discharge on 7 day course of doxycylcine 100mg BID  - Robaxin 750mg PO Q8 and lidocaine gel for muscle spasm  - Per wound care:  Cleanse ulcers with normal saline, apply Cavilon skin prep to periwound, Medihoney to wound, cover with a foam dressing every other day of prn if soiled or saturated.     Problem/Plan - 2:  ·  Problem: Hydradenitis.   ·  Plan: Known history for which patient believes she was on prophylactic antibiotics, although cannot recall names. Unclear if her cellulitis is a flare up of uncontrolled HS.   -Outpatient dermatology follow up       Problem/Plan - 4:  ·  Problem: Elevated blood pressure reading.   - Discharge on amlodipine 5mg daily      Problem/Plan - 5:  ·  Problem: Anemia.   ·  Plan: Iron panel suggestive of HARRY (low ferritin, low % sat)  Discharge on PO iron 325mg daily      Problem/Plan - 6:  ·  Problem: Overweight.   ·  Plan: Calculated BMI 28.9. A1c 5.8. Prediabetes.  - Outpatient follow up    Inpatient treatment course: Vancomycin, wound care  New medications: doxcycline 100, amlodipine 5mg, robaxin 750mg q8, lidocaine gel, dressing change supplies  Labs to be followed outpatient: None  Exam to be followed outpatient: RLE, skin exam

## 2021-11-23 NOTE — DISCHARGE NOTE NURSING/CASE MANAGEMENT/SOCIAL WORK - NSDCFUADDAPPT_GEN_ALL_CORE_FT
Please follow up with your dermatologist at [facility] [address] [place] [address] On 11/24/21 Wednesday at 11AM. Please call [phone] for any questions.    Please follow up at [facility] clinic at [address] [place] [address] 2nd floor on 11/30/21 Tuesday at 2PM

## 2021-11-23 NOTE — DISCHARGE NOTE PROVIDER - NSDCCPCAREPLAN_GEN_ALL_CORE_FT
PRINCIPAL DISCHARGE DIAGNOSIS  Diagnosis: Cellulitis  Assessment and Plan of Treatment: You were admitted to the hospital for cellulitis, which is a skin infection. We treated you with IV antibiotics which imrpoved the cellulitis. We also had the wound care team evaluate you for proper dressing chenges. You will have to continue taking a 7 day course of antibiotics by mouth. During your stay you were also found to have an elevtaed blood pressure, which is the pressure in your arteries that supply the organs of the body. Becuase of this we are starting you on a small dose of blood pressure medication called amlodipine. You were also found to be anemic (low red blood cell levels) janeneley because of low iron. We started you on an iron supplmement for this.  If you begin to have worsening pain in your leg, fever, chills, shortness of breath or lightheadedness, please go to the emergency room.  Please take the following medications:  1. doxycyline hylate 100mg twice a day for 7 days  2. robaxin 750mg every 8 hours as needed for muscle spasms  3. Lidocaine gel every 8 hours as neede for muscle spasms  4. amlodipine 5mg 1 tablet per day every day for blood pressure  5. Iron sulfate 325mg oral tablet once a day  Please follow the below instructions on dressing changes:  Cleanse ulcer with normal saline, apply Cavilon skin prep to periwound, Medihoney to wound, cover with a foam dressing every other day or as needed if soiled or saturated.  Please follow up with [facility]   Please follow up with your dermatologist within 1 week

## 2021-11-23 NOTE — ADVANCED PRACTICE NURSE CONSULT - RECOMMEDATIONS
Right lower extremity ulcer - Cleanse ulcer with normal saline, apply Cavilon skin prep to periwound, Medihoney to wound, cover with a foam dressing every other day of prn if soiled or saturated.  WOCN discussed assessment and recommendations with the patient and house staff, Dr Alcazar.   Right lower extremity ulcers - Cleanse ulcer with normal saline, apply Cavilon skin prep to periwound, Medihoney to wound, cover with a foam dressing every other day of prn if soiled or saturated.  WOCN discussed assessment and recommendations with the patient and house staff, Dr Alcazar.   Right lower extremity ulcers - Cleanse ulcers with normal saline, apply Cavilon skin prep to periwound, Medihoney to wound, cover with a foam dressing every other day of prn if soiled or saturated.  WOCN discussed assessment and recommendations with the patient and house staff, Dr Alcazar.   Right lower extremity ulcers - Cleanse ulcers with normal saline, apply Cavilon skin prep to periwound, Medihoney to wound, cover with a foam dressing every other day of prn if soiled or saturated.  WOCN discussed assessment and recommendations with the patient, SHEELA Vences and house staff, Dr Alcazar.

## 2021-11-23 NOTE — ADVANCED PRACTICE NURSE CONSULT - ASSESSMENT
Right lower extremity calf ulcer with 80% yellow fibrin and 20% soft eschar. Ulcer draining moderate amount of serous exudate. The patient stated it started as a firm intact area that began to drain purulent exudate.  WOCN instructed the patient on wound care to ulcer. The patient cleansed ulcer with normal saline, applied Cavilon skin prep to periwound, Medihoney to wound, covered with a foam dressing. WOCN instructed the patient to change the dressing every other day or as needed if the dressing is soiled or saturated. The patient verbalized understanding and demonstrated ability to apply the dressing. WOCN provided the patient with wound care supplies.  Right lower extremity calf two ulcers with 80% yellow fibrin and 20% soft eschar, total measurement 8 cm x 4 cm. Ulcers draining moderate amount of serous exudate. The patient stated it started as a firm intact area that began to drain purulent exudate.  WO instructed the patient on wound care to ulcer. The patient cleansed ulcer with normal saline, applied Cavilon skin prep to periwound, Medihoney to wound, covered with a foam dressing. WOCN instructed the patient to change the dressing every other day or as needed if the dressing is soiled or saturated. The patient verbalized understanding and demonstrated ability to apply the dressing. [facility] provided the patient with wound care supplies.  Right lower extremity calf two ulcers with 80% yellow fibrin and 20% soft eschar, total measurement 8 cm x 4 cm. Ulcers draining moderate amount of serous exudate. The patient stated it started as a firm intact area that began to drain purulent exudate.  WOCN instructed the patient on wound care to ulcer. The patient cleansed ulcer with normal saline, WOCN applied Cavilon skin prep to periwound, Medihoney to wound, covered with a foam dressing. WOCN instructed the patient to change the dressing every other day or as needed if the dressing is soiled or saturated. The patient verbalized understanding and intent to follow instructions. WOCN provided the patient with wound care supplies.  Right lower extremity calf two ulcers with 80% yellow fibrin and 20% soft eschar, total measurement 8 cm x 4 cm. Ulcers draining moderate amount of serous exudate. The patient stated it started as a firm intact area that began to drain purulent exudate.  WOCN instructed the patient on wound care to ulcer. The patient cleansed ulcers with normal saline, WOCN applied Cavilon skin prep to periwound, Medihoney to wound, covered with a foam dressing. WOCN instructed the patient to change the dressing every other day or as needed if the dressing is soiled or saturated. The patient verbalized understanding and intent to follow instructions. WOCN provided the patient with wound care supplies.  Right lower extremity calf two ulcers with 80% yellow fibrin and 20% soft eschar, total measurement 8 cm x 4 cm. Ulcers draining moderate amount of serous exudate. The patient stated it started as a firm intact area that began to drain purulent exudate.  WOCN instructed the patient on wound care to ulcer. The patient cleansed ulcers with normal saline, WOCN applied Cavilon skin prep to periwound, Medihoney to ulcers, covered with a foam dressing. WOCN instructed the patient to change the dressing every other day or as needed if the dressing is soiled or saturated. The patient verbalized understanding and intent to follow instructions. WOCN provided the patient with wound care supplies.  Right lower extremity calf two ulcers with 80% yellow fibrin and 20% soft eschar, total measurement 8 cm x 4 cm. Ulcers draining moderate amount of serous exudate. The patient stated it started as a firm intact area that began to drain purulent exudate.  WOCN instructed the patient on wound care to ulcers. The patient cleansed ulcers with normal saline, WOCN applied Cavilon skin prep to periwound, Medihoney to ulcers, covered with a foam dressing. WOCN instructed the patient to change the dressing every other day or as needed if the dressing is soiled or saturated. The patient verbalized understanding and intent to follow instructions. WOCN provided the patient with wound care supplies.

## 2021-11-23 NOTE — DISCHARGE NOTE NURSING/CASE MANAGEMENT/SOCIAL WORK - PATIENT PORTAL LINK FT
You can access the FollowMyHealth Patient Portal offered by [facility] by registering at the following website: [email or web address]. By joining [employer]’s FollowMyHealth portal, you will also be able to view your health information using other applications (apps) compatible with our system.

## 2021-11-24 LAB
-  AMPICILLIN/SULBACTAM: SIGNIFICANT CHANGE UP
-  AMPICILLIN: SIGNIFICANT CHANGE UP
-  AMPICILLIN: SIGNIFICANT CHANGE UP
-  CEFAZOLIN: SIGNIFICANT CHANGE UP
-  CEFEPIME: SIGNIFICANT CHANGE UP
-  CEFOXITIN: SIGNIFICANT CHANGE UP
-  CEFTRIAXONE: SIGNIFICANT CHANGE UP
-  CIPROFLOXACIN: SIGNIFICANT CHANGE UP
-  CIPROFLOXACIN: SIGNIFICANT CHANGE UP
-  ERTAPENEM: SIGNIFICANT CHANGE UP
-  GENTAMICIN: SIGNIFICANT CHANGE UP
-  LEVOFLOXACIN: SIGNIFICANT CHANGE UP
-  PIPERACILLIN/TAZOBACTAM: SIGNIFICANT CHANGE UP
-  TOBRAMYCIN: SIGNIFICANT CHANGE UP
-  TRIMETHOPRIM/SULFAMETHOXAZOLE: SIGNIFICANT CHANGE UP
-  VANCOMYCIN: SIGNIFICANT CHANGE UP
CULTURE RESULTS: SIGNIFICANT CHANGE UP
METHOD TYPE: SIGNIFICANT CHANGE UP
ORGANISM # SPEC MICROSCOPIC CNT: SIGNIFICANT CHANGE UP
SPECIMEN SOURCE: SIGNIFICANT CHANGE UP

## 2021-11-25 LAB
CULTURE RESULTS: SIGNIFICANT CHANGE UP
CULTURE RESULTS: SIGNIFICANT CHANGE UP
SPECIMEN SOURCE: SIGNIFICANT CHANGE UP
SPECIMEN SOURCE: SIGNIFICANT CHANGE UP

## 2021-11-29 DIAGNOSIS — R74.01 ELEVATION OF LEVELS OF LIVER TRANSAMINASE LEVELS: ICD-10-CM

## 2021-11-29 DIAGNOSIS — I10 ESSENTIAL (PRIMARY) HYPERTENSION: ICD-10-CM

## 2021-11-29 DIAGNOSIS — L73.2 HIDRADENITIS SUPPURATIVA: ICD-10-CM

## 2021-11-29 DIAGNOSIS — R73.03 PREDIABETES: ICD-10-CM

## 2021-11-29 DIAGNOSIS — R52 PAIN, UNSPECIFIED: ICD-10-CM

## 2021-11-29 DIAGNOSIS — L03.115 CELLULITIS OF RIGHT LOWER LIMB: ICD-10-CM

## 2021-11-29 DIAGNOSIS — L97.919 NON-PRESSURE CHRONIC ULCER OF UNSPECIFIED PART OF RIGHT LOWER LEG WITH UNSPECIFIED SEVERITY: ICD-10-CM

## 2021-11-29 DIAGNOSIS — R32 UNSPECIFIED URINARY INCONTINENCE: ICD-10-CM

## 2021-11-29 DIAGNOSIS — D50.9 IRON DEFICIENCY ANEMIA, UNSPECIFIED: ICD-10-CM

## 2021-11-29 DIAGNOSIS — E66.3 OVERWEIGHT: ICD-10-CM

## 2021-11-30 ENCOUNTER — APPOINTMENT (OUTPATIENT)
Age: 51
End: 2021-11-30

## 2022-04-07 NOTE — DISCHARGE NOTE PROVIDER - NSDCADMDATE_GEN_ALL_CORE_FT
"Fat and Cholesterol Restricted Eating Plan  Getting too much fat and cholesterol in your diet may cause health problems. Choosing the right foods helps keep your fat and cholesterol at normal levels. This can keep you from getting certain diseases.  Your doctor may recommend an eating plan that includes:  Total fat: ______% or less of total calories a day.  Saturated fat: ______% or less of total calories a day.  Cholesterol: less than _________mg a day.  Fiber: ______g a day.  What are tips for following this plan?  Meal planning  At meals, divide your plate into four equal parts:  Fill one-half of your plate with vegetables and green salads.  Fill one-fourth of your plate with whole grains.  Fill one-fourth of your plate with low-fat (lean) protein foods.  Eat fish that is high in omega-3 fats at least two times a week. This includes mackerel, tuna, sardines, and salmon.  Eat foods that are high in fiber, such as whole grains, beans, apples, broccoli, carrots, peas, and barley.  General tips    Work with your doctor to lose weight if you need to.  Avoid:  Foods with added sugar.  Fried foods.  Foods with partially hydrogenated oils.  Limit alcohol intake to no more than 1 drink a day for nonpregnant women and 2 drinks a day for men. One drink equals 12 oz of beer, 5 oz of wine, or 1½ oz of hard liquor.    Reading food labels  Check food labels for:  Trans fats.  Partially hydrogenated oils.  Saturated fat (g) in each serving.  Cholesterol (mg) in each serving.  Fiber (g) in each serving.  Choose foods with healthy fats, such as:  Monounsaturated fats.  Polyunsaturated fats.  Omega-3 fats.  Choose grain products that have whole grains. Look for the word \"whole\" as the first word in the ingredient list.  Cooking  Cook foods using low-fat methods. These include baking, boiling, grilling, and broiling.  Eat more home-cooked foods. Eat at restaurants and buffets less often.  Avoid cooking using saturated fats, such as " butter, cream, palm oil, palm kernel oil, and coconut oil.  Recommended foods    Fruits  All fresh, canned (in natural juice), or frozen fruits.  Vegetables  Fresh or frozen vegetables (raw, steamed, roasted, or grilled). Green salads.  Grains  Whole grains, such as whole wheat or whole grain breads, crackers, cereals, and pasta. Unsweetened oatmeal, bulgur, barley, quinoa, or brown rice. Corn or whole wheat flour tortillas.  Meats and other protein foods  Ground beef (85% or leaner), grass-fed beef, or beef trimmed of fat. Skinless chicken or turkey. Ground chicken or turkey. Pork trimmed of fat. All fish and seafood. Egg whites. Dried beans, peas, or lentils. Unsalted nuts or seeds. Unsalted canned beans. Nut butters without added sugar or oil.  Dairy  Low-fat or nonfat dairy products, such as skim or 1% milk, 2% or reduced-fat cheeses, low-fat and fat-free ricotta or cottage cheese, or plain low-fat and nonfat yogurt.  Fats and oils  Tub margarine without trans fats. Light or reduced-fat mayonnaise and salad dressings. Avocado. Olive, canola, sesame, or safflower oils.  The items listed above may not be a complete list of foods and beverages you can eat. Contact a dietitian for more information.  Foods to avoid  Fruits  Canned fruit in heavy syrup. Fruit in cream or butter sauce. Fried fruit.  Vegetables  Vegetables cooked in cheese, cream, or butter sauce. Fried vegetables.  Grains  White bread. White pasta. White rice. Cornbread. Bagels, pastries, and croissants. Crackers and snack foods that contain trans fat and hydrogenated oils.  Meats and other protein foods  Fatty cuts of meat. Ribs, chicken wings, nolan, sausage, bologna, salami, chitterlings, fatback, hot dogs, bratwurst, and packaged lunch meats. Liver and organ meats. Whole eggs and egg yolks. Chicken and turkey with skin. Fried meat.  Dairy  Whole or 2% milk, cream, half-and-half, and cream cheese. Whole milk cheeses. Whole-fat or sweetened yogurt.  Full-fat cheeses. Nondairy creamers and whipped toppings. Processed cheese, cheese spreads, and cheese curds.  Beverages  Alcohol. Sugar-sweetened drinks such as sodas, lemonade, and fruit drinks.  Fats and oils  Butter, stick margarine, lard, shortening, ghee, or nolan fat. Coconut, palm kernel, and palm oils.  Sweets and desserts  Corn syrup, sugars, honey, and molasses. Candy. Jam and jelly. Syrup. Sweetened cereals. Cookies, pies, cakes, donuts, muffins, and ice cream.  The items listed above may not be a complete list of foods and beverages you should avoid. Contact a dietitian for more information.  Summary  Choosing the right foods helps keep your fat and cholesterol at normal levels. This can keep you from getting certain diseases.  At meals, fill one-half of your plate with vegetables and green salads.  Eat high-fiber foods, like whole grains, beans, apples, carrots, peas, and barley.  Limit added sugar, saturated fats, alcohol, and fried foods.  This information is not intended to replace advice given to you by your health care provider. Make sure you discuss any questions you have with your health care provider.  Document Revised: 04/21/2021 Document Reviewed: 04/21/2021  Elsevier Patient Education © 2021 [employer].     20-Nov-2021 21:09

## 2022-04-22 ENCOUNTER — EMERGENCY (EMERGENCY)
Facility: HOSPITAL | Age: 52
LOS: 1 days | Discharge: HOME | End: 2022-04-22
Admitting: EMERGENCY MEDICINE
Payer: COMMERCIAL

## 2022-04-22 ENCOUNTER — APPOINTMENT (OUTPATIENT)
Dept: INTERNAL MEDICINE | Facility: CLINIC | Age: 52
End: 2022-04-22
Payer: MEDICAID

## 2022-04-22 VITALS
SYSTOLIC BLOOD PRESSURE: 140 MMHG | RESPIRATION RATE: 18 BRPM | TEMPERATURE: 98 F | HEART RATE: 81 BPM | OXYGEN SATURATION: 99 % | DIASTOLIC BLOOD PRESSURE: 71 MMHG

## 2022-04-22 VITALS
HEIGHT: 68 IN | TEMPERATURE: 98 F | SYSTOLIC BLOOD PRESSURE: 170 MMHG | DIASTOLIC BLOOD PRESSURE: 95 MMHG | RESPIRATION RATE: 18 BRPM | OXYGEN SATURATION: 99 % | WEIGHT: 179.9 LBS | HEART RATE: 99 BPM

## 2022-04-22 VITALS
DIASTOLIC BLOOD PRESSURE: 97 MMHG | HEIGHT: 68 IN | TEMPERATURE: 98.9 F | OXYGEN SATURATION: 100 % | SYSTOLIC BLOOD PRESSURE: 144 MMHG | HEART RATE: 81 BPM

## 2022-04-22 DIAGNOSIS — I10 ESSENTIAL (PRIMARY) HYPERTENSION: ICD-10-CM

## 2022-04-22 DIAGNOSIS — L03.116 CELLULITIS OF LEFT LOWER LIMB: ICD-10-CM

## 2022-04-22 DIAGNOSIS — M79.662 PAIN IN LEFT LOWER LEG: ICD-10-CM

## 2022-04-22 DIAGNOSIS — L73.2 HIDRADENITIS SUPPURATIVA: Chronic | ICD-10-CM

## 2022-04-22 DIAGNOSIS — R21 RASH AND OTHER NONSPECIFIC SKIN ERUPTION: ICD-10-CM

## 2022-04-22 DIAGNOSIS — B35.1 TINEA UNGUIUM: ICD-10-CM

## 2022-04-22 DIAGNOSIS — L73.2 HIDRADENITIS SUPPURATIVA: ICD-10-CM

## 2022-04-22 DIAGNOSIS — M79.89 OTHER SPECIFIED SOFT TISSUE DISORDERS: ICD-10-CM

## 2022-04-22 DIAGNOSIS — Z20.822 CONTACT WITH AND (SUSPECTED) EXPOSURE TO COVID-19: ICD-10-CM

## 2022-04-22 LAB
ALBUMIN SERPL ELPH-MCNC: 3.9 G/DL — SIGNIFICANT CHANGE UP (ref 3.3–5)
ALP SERPL-CCNC: 83 U/L — SIGNIFICANT CHANGE UP (ref 40–120)
ALT FLD-CCNC: 16 U/L — SIGNIFICANT CHANGE UP (ref 10–45)
ANION GAP SERPL CALC-SCNC: 10 MMOL/L — SIGNIFICANT CHANGE UP (ref 5–17)
APTT BLD: 30.6 SEC — SIGNIFICANT CHANGE UP (ref 27.5–35.5)
AST SERPL-CCNC: 17 U/L — SIGNIFICANT CHANGE UP (ref 10–40)
BASOPHILS # BLD AUTO: 0.04 K/UL — SIGNIFICANT CHANGE UP (ref 0–0.2)
BASOPHILS NFR BLD AUTO: 0.4 % — SIGNIFICANT CHANGE UP (ref 0–2)
BILIRUB SERPL-MCNC: 0.4 MG/DL — SIGNIFICANT CHANGE UP (ref 0.2–1.2)
BUN SERPL-MCNC: 10 MG/DL — SIGNIFICANT CHANGE UP (ref 7–23)
CALCIUM SERPL-MCNC: 9.8 MG/DL — SIGNIFICANT CHANGE UP (ref 8.4–10.5)
CHLORIDE SERPL-SCNC: 102 MMOL/L — SIGNIFICANT CHANGE UP (ref 96–108)
CO2 SERPL-SCNC: 26 MMOL/L — SIGNIFICANT CHANGE UP (ref 22–31)
CREAT SERPL-MCNC: 0.72 MG/DL — SIGNIFICANT CHANGE UP (ref 0.5–1.3)
EGFR: 100 ML/MIN/1.73M2 — SIGNIFICANT CHANGE UP
EOSINOPHIL # BLD AUTO: 0.09 K/UL — SIGNIFICANT CHANGE UP (ref 0–0.5)
EOSINOPHIL NFR BLD AUTO: 0.9 % — SIGNIFICANT CHANGE UP (ref 0–6)
GLUCOSE SERPL-MCNC: 99 MG/DL — SIGNIFICANT CHANGE UP (ref 70–99)
HCT VFR BLD CALC: 33.9 % — LOW (ref 34.5–45)
HGB BLD-MCNC: 11 G/DL — LOW (ref 11.5–15.5)
IMM GRANULOCYTES NFR BLD AUTO: 0.3 % — SIGNIFICANT CHANGE UP (ref 0–1.5)
INR BLD: 1.19 — HIGH (ref 0.88–1.16)
LACTATE SERPL-SCNC: 0.8 MMOL/L — SIGNIFICANT CHANGE UP (ref 0.5–2)
LYMPHOCYTES # BLD AUTO: 1.75 K/UL — SIGNIFICANT CHANGE UP (ref 1–3.3)
LYMPHOCYTES # BLD AUTO: 18 % — SIGNIFICANT CHANGE UP (ref 13–44)
MCHC RBC-ENTMCNC: 30 PG — SIGNIFICANT CHANGE UP (ref 27–34)
MCHC RBC-ENTMCNC: 32.4 GM/DL — SIGNIFICANT CHANGE UP (ref 32–36)
MCV RBC AUTO: 92.4 FL — SIGNIFICANT CHANGE UP (ref 80–100)
MONOCYTES # BLD AUTO: 0.64 K/UL — SIGNIFICANT CHANGE UP (ref 0–0.9)
MONOCYTES NFR BLD AUTO: 6.6 % — SIGNIFICANT CHANGE UP (ref 2–14)
NEUTROPHILS # BLD AUTO: 7.17 K/UL — SIGNIFICANT CHANGE UP (ref 1.8–7.4)
NEUTROPHILS NFR BLD AUTO: 73.8 % — SIGNIFICANT CHANGE UP (ref 43–77)
NRBC # BLD: 0 /100 WBCS — SIGNIFICANT CHANGE UP (ref 0–0)
PLATELET # BLD AUTO: 302 K/UL — SIGNIFICANT CHANGE UP (ref 150–400)
POTASSIUM SERPL-MCNC: 3.2 MMOL/L — LOW (ref 3.5–5.3)
POTASSIUM SERPL-SCNC: 3.2 MMOL/L — LOW (ref 3.5–5.3)
PROT SERPL-MCNC: 8.3 G/DL — SIGNIFICANT CHANGE UP (ref 6–8.3)
PROTHROM AB SERPL-ACNC: 14.2 SEC — HIGH (ref 10.5–13.4)
RBC # BLD: 3.67 M/UL — LOW (ref 3.8–5.2)
RBC # FLD: 13.1 % — SIGNIFICANT CHANGE UP (ref 10.3–14.5)
SARS-COV-2 RNA SPEC QL NAA+PROBE: NEGATIVE — SIGNIFICANT CHANGE UP
SODIUM SERPL-SCNC: 138 MMOL/L — SIGNIFICANT CHANGE UP (ref 135–145)
WBC # BLD: 9.72 K/UL — SIGNIFICANT CHANGE UP (ref 3.8–10.5)
WBC # FLD AUTO: 9.72 K/UL — SIGNIFICANT CHANGE UP (ref 3.8–10.5)

## 2022-04-22 PROCEDURE — 80053 COMPREHEN METABOLIC PANEL: CPT

## 2022-04-22 PROCEDURE — 87635 SARS-COV-2 COVID-19 AMP PRB: CPT

## 2022-04-22 PROCEDURE — 99284 EMERGENCY DEPT VISIT MOD MDM: CPT | Mod: 25

## 2022-04-22 PROCEDURE — 73701 CT LOWER EXTREMITY W/DYE: CPT | Mod: MA

## 2022-04-22 PROCEDURE — 83605 ASSAY OF LACTIC ACID: CPT

## 2022-04-22 PROCEDURE — 87040 BLOOD CULTURE FOR BACTERIA: CPT

## 2022-04-22 PROCEDURE — 99214 OFFICE O/P EST MOD 30 MIN: CPT | Mod: GC

## 2022-04-22 PROCEDURE — 85025 COMPLETE CBC W/AUTO DIFF WBC: CPT

## 2022-04-22 PROCEDURE — 85610 PROTHROMBIN TIME: CPT

## 2022-04-22 PROCEDURE — 93971 EXTREMITY STUDY: CPT

## 2022-04-22 PROCEDURE — 93971 EXTREMITY STUDY: CPT | Mod: 26,LT

## 2022-04-22 PROCEDURE — 85730 THROMBOPLASTIN TIME PARTIAL: CPT

## 2022-04-22 PROCEDURE — 73701 CT LOWER EXTREMITY W/DYE: CPT | Mod: 26,LT,MA

## 2022-04-22 PROCEDURE — 96375 TX/PRO/DX INJ NEW DRUG ADDON: CPT | Mod: XU

## 2022-04-22 PROCEDURE — 99285 EMERGENCY DEPT VISIT HI MDM: CPT

## 2022-04-22 PROCEDURE — 96374 THER/PROPH/DIAG INJ IV PUSH: CPT | Mod: XU

## 2022-04-22 PROCEDURE — 36415 COLL VENOUS BLD VENIPUNCTURE: CPT

## 2022-04-22 RX ORDER — IBUPROFEN 200 MG
1 TABLET ORAL
Qty: 15 | Refills: 0
Start: 2022-04-22

## 2022-04-22 RX ORDER — AZTREONAM 2 G
1 VIAL (EA) INJECTION
Qty: 20 | Refills: 0
Start: 2022-04-22 | End: 2022-05-01

## 2022-04-22 RX ORDER — AMLODIPINE BESYLATE 5 MG/1
5 TABLET ORAL
Refills: 0 | Status: ACTIVE | COMMUNITY
Start: 2022-04-22

## 2022-04-22 RX ORDER — SULFAMETHOXAZOLE AND TRIMETHOPRIM 800; 160 MG/1; MG/1
800-160 TABLET ORAL TWICE DAILY
Qty: 28 | Refills: 0 | Status: DISCONTINUED | COMMUNITY
Start: 2021-05-18 | End: 2022-04-22

## 2022-04-22 RX ORDER — ONDANSETRON 8 MG/1
4 TABLET, FILM COATED ORAL ONCE
Refills: 0 | Status: COMPLETED | OUTPATIENT
Start: 2022-04-22 | End: 2022-04-22

## 2022-04-22 RX ORDER — VANCOMYCIN HCL 1 G
1500 VIAL (EA) INTRAVENOUS ONCE
Refills: 0 | Status: DISCONTINUED | OUTPATIENT
Start: 2022-04-22 | End: 2022-04-22

## 2022-04-22 RX ORDER — VANCOMYCIN HCL 1 G
1500 VIAL (EA) INTRAVENOUS ONCE
Refills: 0 | Status: COMPLETED | OUTPATIENT
Start: 2022-04-22 | End: 2022-04-22

## 2022-04-22 RX ORDER — CEPHALEXIN 500 MG
1 CAPSULE ORAL
Qty: 28 | Refills: 0
Start: 2022-04-22 | End: 2022-04-28

## 2022-04-22 RX ORDER — KETOROLAC TROMETHAMINE 30 MG/ML
15 SYRINGE (ML) INJECTION ONCE
Refills: 0 | Status: DISCONTINUED | OUTPATIENT
Start: 2022-04-22 | End: 2022-04-22

## 2022-04-22 RX ADMIN — Medication 15 MILLIGRAM(S): at 15:58

## 2022-04-22 RX ADMIN — ONDANSETRON 4 MILLIGRAM(S): 8 TABLET, FILM COATED ORAL at 17:15

## 2022-04-22 RX ADMIN — Medication 300 MILLIGRAM(S): at 18:43

## 2022-04-22 NOTE — ED PROVIDER NOTE - PATIENT PORTAL LINK FT
You can access the FollowMyHealth Patient Portal offered by [facility] by registering at the following website: [email or web address]. By joining [employer]’s FollowMyHealth portal, you will also be able to view your health information using other applications (apps) compatible with our system.

## 2022-04-22 NOTE — ED PROVIDER NOTE - OBJECTIVE STATEMENT
51 F pmh hidradenitis suppurativa referred to ED by PMD Dr. Fuller for LLE pain/swelling x 3 weeks.  pt reports she noted small wound to back of L calf 3 weeks ago and now w/ progressively worsening pain/swelling to L lower leg.  States she has not noted any discharge from wound.  Cannot recall any injuries/bites or inciting factors prior to noting wound.  denies f/c, headache, dizziness, fainting, chest pain, sob, abd pain, nvd, numbness/weakness, paresthesia, limited ROM ext, h/o VTE, hormone use, recent travel/immobilization, h/o CA, trauma.

## 2022-04-22 NOTE — ED PROVIDER NOTE - CLINICAL SUMMARY MEDICAL DECISION MAKING FREE TEXT BOX
51 F pmh hidradenitis suppurativa referred to ED by PMD Dr. Fuller for LLE pain/swelling x 3 weeks.  on exam VSS, well appearing,LLE: + swelling/edema, peeling skin to lower leg, small wound w/ scant purulent then serous discharge, no palpable fluctuance/induration, + erythema/warmth extending from wound to lower posterior calf/ankle, FROM all joints, DP/PT pulses 2+, SILT, cap refill intact.  suspect purulent cellulitis, no e/o superficial abscess on exam but will obtain CT to assess and sonogram to r/o DVT.  labs/cultures and toradol for pain

## 2022-04-22 NOTE — ED PROVIDER NOTE - PROGRESS NOTE DETAILS
labs wnl, no leukocytosis, normal lactate.  sonogram shows no e/o DVT although limited study 2/2 pt ability to tolerate (low suspicion as clinically cellulitis).  LLE CT consistent w/ cellulitis, no abscess.   pt given dose vanco in ED, will dc w/ keflex/bactrim (afebrile, nml labs, no h/o DM therefore will trial PO abx rather than admit at this time)  and return in 2-3 days for wound check.  pt agree with plan and discussed strict return parameters

## 2022-04-22 NOTE — ED PROVIDER NOTE - NSFOLLOWUPINSTRUCTIONS_ED_ALL_ED_FT
Please take full course of antibiotics as prescribed for skin infection of left leg.  Keep leg elevated to reduce swelling and wound clean and dry.      Return to ED in 2-3 days for wound check    Return to ED sooner if you have fever, increased pain/swelling/redness, pus discharge from wound, vomiting or other concerns     Cellulitis    Cellulitis is a skin infection caused by bacteria. This condition occurs most often in the arms and lower legs but can occur anywhere over the body. Symptoms include redness, swelling, warm skin, tenderness, and chills/fever. If you were prescribed an antibiotic medicine, take it as told by your health care provider. Do not stop taking the antibiotic even if you start to feel better.    SEEK IMMEDIATE MEDICAL CARE IF YOU HAVE ANY OF THE FOLLOWING SYMPTOMS: worsening fever, red streaks coming from affected area, vomiting or diarrhea, or dizziness/lightheadedness.

## 2022-04-22 NOTE — ED ADULT TRIAGE NOTE - CHIEF COMPLAINT QUOTE
Pt sent by PCP Dr. Song for eval of LLE wound x3 weeks. Pt unsure of cause and endorses pain w palpation. denies fevers, chills, numbness, tingling.

## 2022-04-22 NOTE — ED ADULT NURSE NOTE - OBJECTIVE STATEMENT
pt received into spot A A&Ox4 ambulatory appears comfortable arrives via walk in triage for eval of LLE pain swelling redness blistering sent by pmd for r/o dvt and cellulitis small puncture appearing mini noted. iv placed labs sent

## 2022-04-22 NOTE — ED PROVIDER NOTE - PHYSICAL EXAMINATION
Vitals reviewed  Gen: well appearing, nad, speaking in full sentences  Skin: wwp  HEENT: ncat, eomi, mmm  CV: rrr, no audible m/r/g  Resp: symmetrical expansion, ctab, no w/r/r  Abd: nondistended, soft/nt  LLE: + swelling/edema, peeling skin to lower leg, small wound w/ scant purulent then serous discharge, no palpable fluctuance/induration, + erythema/warmth extending from wound to lower posterior calf/ankle, FROM all joints, DP/PT pulses 2+, SILT, cap refill intact  FROM all other ext, NVI  Neuro: alert/oriented, no focal deficits, steady gait

## 2022-04-26 ENCOUNTER — INPATIENT (INPATIENT)
Facility: HOSPITAL | Age: 52
LOS: 1 days | Discharge: HOME | DRG: 603 | End: 2022-04-28
Payer: COMMERCIAL

## 2022-04-26 VITALS
HEIGHT: 68 IN | WEIGHT: 179.9 LBS | DIASTOLIC BLOOD PRESSURE: 89 MMHG | SYSTOLIC BLOOD PRESSURE: 152 MMHG | RESPIRATION RATE: 18 BRPM | OXYGEN SATURATION: 98 % | HEART RATE: 88 BPM | TEMPERATURE: 98 F

## 2022-04-26 DIAGNOSIS — L73.2 HIDRADENITIS SUPPURATIVA: Chronic | ICD-10-CM

## 2022-04-26 LAB
ALBUMIN SERPL ELPH-MCNC: 3.8 G/DL — SIGNIFICANT CHANGE UP (ref 3.3–5)
ALP SERPL-CCNC: 76 U/L — SIGNIFICANT CHANGE UP (ref 40–120)
ALT FLD-CCNC: 13 U/L — SIGNIFICANT CHANGE UP (ref 10–45)
ANION GAP SERPL CALC-SCNC: 9 MMOL/L — SIGNIFICANT CHANGE UP (ref 5–17)
AST SERPL-CCNC: 17 U/L — SIGNIFICANT CHANGE UP (ref 10–40)
BASOPHILS # BLD AUTO: 0.05 K/UL — SIGNIFICANT CHANGE UP (ref 0–0.2)
BASOPHILS NFR BLD AUTO: 0.6 % — SIGNIFICANT CHANGE UP (ref 0–2)
BILIRUB SERPL-MCNC: 0.4 MG/DL — SIGNIFICANT CHANGE UP (ref 0.2–1.2)
BUN SERPL-MCNC: 10 MG/DL — SIGNIFICANT CHANGE UP (ref 7–23)
CALCIUM SERPL-MCNC: 10 MG/DL — SIGNIFICANT CHANGE UP (ref 8.4–10.5)
CHLORIDE SERPL-SCNC: 104 MMOL/L — SIGNIFICANT CHANGE UP (ref 96–108)
CO2 SERPL-SCNC: 24 MMOL/L — SIGNIFICANT CHANGE UP (ref 22–31)
CREAT SERPL-MCNC: 0.75 MG/DL — SIGNIFICANT CHANGE UP (ref 0.5–1.3)
EGFR: 95 ML/MIN/1.73M2 — SIGNIFICANT CHANGE UP
EOSINOPHIL # BLD AUTO: 0.19 K/UL — SIGNIFICANT CHANGE UP (ref 0–0.5)
EOSINOPHIL NFR BLD AUTO: 2.3 % — SIGNIFICANT CHANGE UP (ref 0–6)
GLUCOSE SERPL-MCNC: 86 MG/DL — SIGNIFICANT CHANGE UP (ref 70–99)
HCT VFR BLD CALC: 34.3 % — LOW (ref 34.5–45)
HGB BLD-MCNC: 10.8 G/DL — LOW (ref 11.5–15.5)
IMM GRANULOCYTES NFR BLD AUTO: 0.4 % — SIGNIFICANT CHANGE UP (ref 0–1.5)
LYMPHOCYTES # BLD AUTO: 1.45 K/UL — SIGNIFICANT CHANGE UP (ref 1–3.3)
LYMPHOCYTES # BLD AUTO: 17.8 % — SIGNIFICANT CHANGE UP (ref 13–44)
MCHC RBC-ENTMCNC: 29 PG — SIGNIFICANT CHANGE UP (ref 27–34)
MCHC RBC-ENTMCNC: 31.5 GM/DL — LOW (ref 32–36)
MCV RBC AUTO: 92 FL — SIGNIFICANT CHANGE UP (ref 80–100)
MONOCYTES # BLD AUTO: 0.62 K/UL — SIGNIFICANT CHANGE UP (ref 0–0.9)
MONOCYTES NFR BLD AUTO: 7.6 % — SIGNIFICANT CHANGE UP (ref 2–14)
NEUTROPHILS # BLD AUTO: 5.81 K/UL — SIGNIFICANT CHANGE UP (ref 1.8–7.4)
NEUTROPHILS NFR BLD AUTO: 71.3 % — SIGNIFICANT CHANGE UP (ref 43–77)
NRBC # BLD: 0 /100 WBCS — SIGNIFICANT CHANGE UP (ref 0–0)
PLATELET # BLD AUTO: 292 K/UL — SIGNIFICANT CHANGE UP (ref 150–400)
POTASSIUM SERPL-MCNC: 4.1 MMOL/L — SIGNIFICANT CHANGE UP (ref 3.5–5.3)
POTASSIUM SERPL-SCNC: 4.1 MMOL/L — SIGNIFICANT CHANGE UP (ref 3.5–5.3)
PROT SERPL-MCNC: 8.7 G/DL — HIGH (ref 6–8.3)
RBC # BLD: 3.73 M/UL — LOW (ref 3.8–5.2)
RBC # FLD: 13.2 % — SIGNIFICANT CHANGE UP (ref 10.3–14.5)
SARS-COV-2 RNA SPEC QL NAA+PROBE: NEGATIVE — SIGNIFICANT CHANGE UP
SODIUM SERPL-SCNC: 137 MMOL/L — SIGNIFICANT CHANGE UP (ref 135–145)
WBC # BLD: 8.15 K/UL — SIGNIFICANT CHANGE UP (ref 3.8–10.5)
WBC # FLD AUTO: 8.15 K/UL — SIGNIFICANT CHANGE UP (ref 3.8–10.5)

## 2022-04-26 PROCEDURE — 73590 X-RAY EXAM OF LOWER LEG: CPT | Mod: 26,LT

## 2022-04-26 PROCEDURE — 99285 EMERGENCY DEPT VISIT HI MDM: CPT

## 2022-04-26 RX ORDER — KETOROLAC TROMETHAMINE 30 MG/ML
15 SYRINGE (ML) INJECTION ONCE
Refills: 0 | Status: DISCONTINUED | OUTPATIENT
Start: 2022-04-26 | End: 2022-04-26

## 2022-04-26 RX ORDER — OXYCODONE AND ACETAMINOPHEN 5; 325 MG/1; MG/1
1 TABLET ORAL ONCE
Refills: 0 | Status: DISCONTINUED | OUTPATIENT
Start: 2022-04-26 | End: 2022-04-26

## 2022-04-26 RX ORDER — SODIUM CHLORIDE 9 MG/ML
500 INJECTION INTRAMUSCULAR; INTRAVENOUS; SUBCUTANEOUS ONCE
Refills: 0 | Status: COMPLETED | OUTPATIENT
Start: 2022-04-26 | End: 2022-04-26

## 2022-04-26 RX ADMIN — SODIUM CHLORIDE 500 MILLILITER(S): 9 INJECTION INTRAMUSCULAR; INTRAVENOUS; SUBCUTANEOUS at 19:00

## 2022-04-26 RX ADMIN — Medication 15 MILLIGRAM(S): at 19:37

## 2022-04-26 RX ADMIN — OXYCODONE AND ACETAMINOPHEN 1 TABLET(S): 5; 325 TABLET ORAL at 20:30

## 2022-04-26 RX ADMIN — Medication 110 MILLIGRAM(S): at 19:56

## 2022-04-26 NOTE — ED PROVIDER NOTE - OBJECTIVE STATEMENT
51F pmhx hidradenitis suppurativa presenitng for continued pain, swelling, and skin irritation to the back of her L lower extremity. Pt was seen in [facility] ED on 4/22 for similar complaint, at that time underwent duplex and ct scan that showed concern for cellulitis. Pt received dose of iv abx and was sent home on PO abx with which she endorses compliance. Pt presents today complaining of lack of resolution and pain with walking. States that she'd like to see a dermatologist and/or wound care because "it's not going away". Denies fevers, drainage/pus, trauma to the leg.

## 2022-04-26 NOTE — ED PROVIDER NOTE - PHYSICAL EXAMINATION
Constitutional: Well appearing, awake, alert, oriented to person, place, time/situation and in no apparent distress.  ENMT: Airway patent.  Eyes: Clear bilaterally, pupils equal, round and reactive to light.  Cardiac: Normal rate, regular rhythm.  Heart sounds S1, S2.  Respiratory: Breath sounds clear and equal bilaterally.  Gastrointestinal: Abdomen soft, non-tender, no guarding.  Neurological: Alert and oriented, no focal deficits, no motor or sensory deficits.  Skin: Painful dermatitis to posterior L calf with evidence of skin sloughing. No fluctuance.

## 2022-04-26 NOTE — ED ADULT TRIAGE NOTE - CHIEF COMPLAINT QUOTE
Pt here for wound check to LLE. Pt received IV ABX here a few days ago, feels wound is not healing. Endorses pain to area. denies f/c, drainage from wound.

## 2022-04-27 ENCOUNTER — TRANSCRIPTION ENCOUNTER (OUTPATIENT)
Age: 52
End: 2022-04-27

## 2022-04-27 DIAGNOSIS — I10 ESSENTIAL (PRIMARY) HYPERTENSION: ICD-10-CM

## 2022-04-27 DIAGNOSIS — L73.2 HIDRADENITIS SUPPURATIVA: ICD-10-CM

## 2022-04-27 DIAGNOSIS — L03.116 CELLULITIS OF LEFT LOWER LIMB: ICD-10-CM

## 2022-04-27 DIAGNOSIS — R63.8 OTHER SYMPTOMS AND SIGNS CONCERNING FOOD AND FLUID INTAKE: ICD-10-CM

## 2022-04-27 LAB
ANION GAP SERPL CALC-SCNC: 10 MMOL/L — SIGNIFICANT CHANGE UP (ref 5–17)
BASOPHILS # BLD AUTO: 0.04 K/UL — SIGNIFICANT CHANGE UP (ref 0–0.2)
BASOPHILS NFR BLD AUTO: 0.7 % — SIGNIFICANT CHANGE UP (ref 0–2)
BUN SERPL-MCNC: 12 MG/DL — SIGNIFICANT CHANGE UP (ref 7–23)
CALCIUM SERPL-MCNC: 9.2 MG/DL — SIGNIFICANT CHANGE UP (ref 8.4–10.5)
CHLORIDE SERPL-SCNC: 104 MMOL/L — SIGNIFICANT CHANGE UP (ref 96–108)
CO2 SERPL-SCNC: 24 MMOL/L — SIGNIFICANT CHANGE UP (ref 22–31)
CREAT SERPL-MCNC: 0.82 MG/DL — SIGNIFICANT CHANGE UP (ref 0.5–1.3)
CULTURE RESULTS: SIGNIFICANT CHANGE UP
CULTURE RESULTS: SIGNIFICANT CHANGE UP
EGFR: 85 ML/MIN/1.73M2 — SIGNIFICANT CHANGE UP
EOSINOPHIL # BLD AUTO: 0.16 K/UL — SIGNIFICANT CHANGE UP (ref 0–0.5)
EOSINOPHIL NFR BLD AUTO: 2.6 % — SIGNIFICANT CHANGE UP (ref 0–6)
GLUCOSE SERPL-MCNC: 92 MG/DL — SIGNIFICANT CHANGE UP (ref 70–99)
HCT VFR BLD CALC: 32.1 % — LOW (ref 34.5–45)
HGB BLD-MCNC: 10 G/DL — LOW (ref 11.5–15.5)
IMM GRANULOCYTES NFR BLD AUTO: 0.3 % — SIGNIFICANT CHANGE UP (ref 0–1.5)
LYMPHOCYTES # BLD AUTO: 1.35 K/UL — SIGNIFICANT CHANGE UP (ref 1–3.3)
LYMPHOCYTES # BLD AUTO: 22.2 % — SIGNIFICANT CHANGE UP (ref 13–44)
MAGNESIUM SERPL-MCNC: 1.7 MG/DL — SIGNIFICANT CHANGE UP (ref 1.6–2.6)
MCHC RBC-ENTMCNC: 29.6 PG — SIGNIFICANT CHANGE UP (ref 27–34)
MCHC RBC-ENTMCNC: 31.2 GM/DL — LOW (ref 32–36)
MCV RBC AUTO: 95 FL — SIGNIFICANT CHANGE UP (ref 80–100)
MONOCYTES # BLD AUTO: 0.58 K/UL — SIGNIFICANT CHANGE UP (ref 0–0.9)
MONOCYTES NFR BLD AUTO: 9.6 % — SIGNIFICANT CHANGE UP (ref 2–14)
NEUTROPHILS # BLD AUTO: 3.92 K/UL — SIGNIFICANT CHANGE UP (ref 1.8–7.4)
NEUTROPHILS NFR BLD AUTO: 64.6 % — SIGNIFICANT CHANGE UP (ref 43–77)
NRBC # BLD: 0 /100 WBCS — SIGNIFICANT CHANGE UP (ref 0–0)
PHOSPHATE SERPL-MCNC: 3.9 MG/DL — SIGNIFICANT CHANGE UP (ref 2.5–4.5)
PLATELET # BLD AUTO: 290 K/UL — SIGNIFICANT CHANGE UP (ref 150–400)
POTASSIUM SERPL-MCNC: 4 MMOL/L — SIGNIFICANT CHANGE UP (ref 3.5–5.3)
POTASSIUM SERPL-SCNC: 4 MMOL/L — SIGNIFICANT CHANGE UP (ref 3.5–5.3)
RBC # BLD: 3.38 M/UL — LOW (ref 3.8–5.2)
RBC # FLD: 13.2 % — SIGNIFICANT CHANGE UP (ref 10.3–14.5)
SODIUM SERPL-SCNC: 138 MMOL/L — SIGNIFICANT CHANGE UP (ref 135–145)
SPECIMEN SOURCE: SIGNIFICANT CHANGE UP
SPECIMEN SOURCE: SIGNIFICANT CHANGE UP
WBC # BLD: 6.07 K/UL — SIGNIFICANT CHANGE UP (ref 3.8–10.5)
WBC # FLD AUTO: 6.07 K/UL — SIGNIFICANT CHANGE UP (ref 3.8–10.5)

## 2022-04-27 PROCEDURE — 99223 1ST HOSP IP/OBS HIGH 75: CPT | Mod: GC

## 2022-04-27 RX ORDER — LOSARTAN POTASSIUM 100 MG/1
1 TABLET, FILM COATED ORAL
Qty: 0 | Refills: 0 | DISCHARGE

## 2022-04-27 RX ORDER — ACETAMINOPHEN 500 MG
650 TABLET ORAL EVERY 6 HOURS
Refills: 0 | Status: DISCONTINUED | OUTPATIENT
Start: 2022-04-27 | End: 2022-04-28

## 2022-04-27 RX ORDER — KETOROLAC TROMETHAMINE 30 MG/ML
15 SYRINGE (ML) INJECTION EVERY 6 HOURS
Refills: 0 | Status: DISCONTINUED | OUTPATIENT
Start: 2022-04-27 | End: 2022-04-28

## 2022-04-27 RX ORDER — LOSARTAN POTASSIUM 100 MG/1
50 TABLET, FILM COATED ORAL DAILY
Refills: 0 | Status: DISCONTINUED | OUTPATIENT
Start: 2022-04-27 | End: 2022-04-27

## 2022-04-27 RX ORDER — ACETAMINOPHEN 500 MG
650 TABLET ORAL EVERY 6 HOURS
Refills: 0 | Status: DISCONTINUED | OUTPATIENT
Start: 2022-04-27 | End: 2022-04-27

## 2022-04-27 RX ORDER — CEFAZOLIN SODIUM 1 G
1000 VIAL (EA) INJECTION EVERY 8 HOURS
Refills: 0 | Status: DISCONTINUED | OUTPATIENT
Start: 2022-04-27 | End: 2022-04-28

## 2022-04-27 RX ORDER — ACETAMINOPHEN 500 MG
325 TABLET ORAL ONCE
Refills: 0 | Status: COMPLETED | OUTPATIENT
Start: 2022-04-27 | End: 2022-04-27

## 2022-04-27 RX ADMIN — Medication 15 MILLIGRAM(S): at 22:54

## 2022-04-27 RX ADMIN — Medication 650 MILLIGRAM(S): at 14:50

## 2022-04-27 RX ADMIN — Medication 100 MILLIGRAM(S): at 05:16

## 2022-04-27 RX ADMIN — Medication 650 MILLIGRAM(S): at 06:16

## 2022-04-27 RX ADMIN — Medication 15 MILLIGRAM(S): at 21:54

## 2022-04-27 RX ADMIN — Medication 15 MILLIGRAM(S): at 16:31

## 2022-04-27 RX ADMIN — Medication 325 MILLIGRAM(S): at 13:48

## 2022-04-27 RX ADMIN — Medication 100 MILLIGRAM(S): at 22:03

## 2022-04-27 RX ADMIN — Medication 15 MILLIGRAM(S): at 08:45

## 2022-04-27 RX ADMIN — Medication 650 MILLIGRAM(S): at 20:18

## 2022-04-27 RX ADMIN — Medication 650 MILLIGRAM(S): at 13:48

## 2022-04-27 RX ADMIN — Medication 100 MILLIGRAM(S): at 15:29

## 2022-04-27 RX ADMIN — Medication 15 MILLIGRAM(S): at 17:40

## 2022-04-27 RX ADMIN — Medication 650 MILLIGRAM(S): at 05:16

## 2022-04-27 RX ADMIN — Medication 650 MILLIGRAM(S): at 19:24

## 2022-04-27 RX ADMIN — Medication 325 MILLIGRAM(S): at 14:50

## 2022-04-27 RX ADMIN — Medication 15 MILLIGRAM(S): at 09:10

## 2022-04-27 NOTE — H&P ADULT - NSHPPHYSICALEXAM_GEN_ALL_CORE
.  VITAL SIGNS:  T(C): 36.8 (04-27-22 @ 00:13), Max: 36.8 (04-27-22 @ 00:13)  T(F): 98.2 (04-27-22 @ 00:13), Max: 98.2 (04-27-22 @ 00:13)  HR: 77 (04-27-22 @ 00:13) (77 - 88)  BP: 135/81 (04-27-22 @ 00:13) (135/81 - 153/80)  BP(mean): --  RR: 18 (04-27-22 @ 00:13) (18 - 18)  SpO2: 100% (04-27-22 @ 00:13) (97% - 100%)  Wt(kg): --    PHYSICAL EXAM:    Constitutional: WDWN resting comfortably in bed; NAD  Head: NC/AT  Eyes: PERRL, EOMI, anicteric sclera  ENT: no nasal discharge; uvula midline, no oropharyngeal erythema or exudates; MMM  Neck: supple; no JVD or thyromegaly  Respiratory: CTA B/L; no W/R/R, no retractions  Cardiac: +S1/S2; RRR; no M/R/G; PMI non-displaced  Gastrointestinal: abdomen soft, NT/ND; no rebound or guarding; +BSx4  Back: spine midline, no bony tenderness or step-offs; no CVAT B/L  Extremities: WWP, no clubbing or cyanosis; no peripheral edema  Musculoskeletal: NROM x4; no joint swelling, tenderness or erythema  Vascular: 2+ radial, femoral, DP/PT pulses B/L  Dermatologic: skin warm, dry and intact; no rashes, wounds, or scars  Lymphatic: no submandibular or cervical LAD  Neurologic: AAOx3; CNII-XII grossly intact; no focal deficits  Psychiatric: affect and characteristics of appearance, verbalizations, behaviors are appropriate

## 2022-04-27 NOTE — H&P ADULT - PROBLEM SELECTOR PLAN 4
F: None   E: Replete for K<4, Mag<2  N: Regular Diet  A: None  Code status: Full  Dispo: [facility]

## 2022-04-27 NOTE — PROGRESS NOTE ADULT - PROBLEM SELECTOR PLAN 4
F: None   E: Replete for K<4, Mag<2  N: Regular Diet  PPx: improve score 0 , not indicated  Code status: Full  Dispo: [facility]

## 2022-04-27 NOTE — PROGRESS NOTE ADULT - PROBLEM SELECTOR PLAN 1
Patient presenting with 4-week history of left lower posterior ankle swelling/edema, skin peeling, discoloration, and warmth. No serous or purulent discharge. Full ROM in ankle. DP/PT pulses 2+.  Recent ED visit 5 days ago with discharge on Keflex and Bactrim without improvement, however did not finish full course. CT LE suspicious for cellulitis.  Likely nonpurulent cellulitis. Improvement after IV abx 1 day  - c/w cefazolin IV 1g q8 for MSSA  - Likely transition to PO abx tomorrow   - Tylenol PRN mild-moderate pain, Ketorolac PRN for severe pain

## 2022-04-27 NOTE — DISCHARGE NOTE PROVIDER - CARE PROVIDER_API CALL
Joleen Fuller ([facility]  [facility]  [address] [place] [address]  Phone: [phone]  Fax: [phone]  Follow Up Time: 2 weeks

## 2022-04-27 NOTE — H&P ADULT - ATTENDING COMMENTS
#LLE cellulitis: c/w ancef for non purulent cellultis. No hx of mrsa based on previous cx. Wound care consult.

## 2022-04-27 NOTE — H&P ADULT - HISTORY OF PRESENT ILLNESS
51F w/ PMHx hidradenitis suppurativa and HTN, p/w 4-week h/o worsening pain, swelling, and skin peeling of her left posterior ankle. Denies any pus or discharge. Reports that it has had bleeding. Of note she came into [facility] ED 5 days ago on 4/22/22 and was prescribed PO Keflex, which she took as prescribed, but the leg lesion did not improve. She also had similar lesion on right posterior ankle in November 2021 for which she was admitted for IV Abx and has fully resolved. Reports she is a patient of Dr. Fuller at [facility] and has seen a dermatologist in the past who helped with antibiotics and pain management of her skin lesions. She reports hidradenitis lesions in b/l armpits in 2012 s/p excision and skin grafting with     In the ED:  Initial vital signs: T: 98.1 F, HR: 88, BP: 152/89, R: 18, SpO2: 98% on RA  Labs: significant for WBC 8.1, Hgb 10.8, CMP wnl  Imaging:  CXR: None  EKG: None  Medications: Doxy , Percocet x1, Ketorolac 15, NS 500cc  Consults: None  51F w/ PMHx hidradenitis suppurativa and HTN, p/w 4-week h/o worsening pain, swelling, and skin peeling of her left posterior ankle. Denies any pus or discharge. Reports that it has had bleeding. Of note she came into [facility] ED 5 days ago on 4/22/22 and was prescribed PO Bactrim and Keflex, which she took as prescribed, but the leg lesion did not improve. She also had similar lesion on right posterior ankle in November 2021 for which she was admitted for IV Abx and has fully resolved. Reports she is a patient of Dr. Fuller at [facility] and has seen a dermatologist in the past who helped with antibiotics and pain management of her skin lesions. She reports hidradenitis lesions in b/l armpits in 2012 s/p excision and skin grafting surgery. Denies f/c, CP, SOB, abdominal pain, n/v/d/c.     In the ED:  Initial vital signs: T: 98.1 F, HR: 88, BP: 152/89, R: 18, SpO2: 98% on RA  Labs: significant for WBC 8.1, Hgb 10.8, CMP wnl  Imaging:  CXR: None  EKG: None  Medications: Doxy , Percocet x1, Ketorolac 15, NS 500cc  Consults: None

## 2022-04-27 NOTE — PATIENT PROFILE ADULT - FALL HARM RISK - HARM RISK INTERVENTIONS

## 2022-04-27 NOTE — H&P ADULT - PROBLEM SELECTOR PLAN 1
Patient presenting with 4-week history of left lower posterior ankle swelling/edema, skin peeling, discoloration, and warmth. No serous or purulent discharge. Full ROM in ankle. DP/PT pulses 2+.  Recent ED visit 5 days ago with discharge on Keflex and Bactrim without improvement. CT LE suspicious for cellulitis. Doppler U/S negative for DVT. 0/4 SIRS criteria. Likely cellulitis, no evidence superficial abscess on exam.  - S/p doxycycline  in ED  - C/w doxy  qd  - Wound care consult Patient presenting with 4-week history of left lower posterior ankle swelling/edema, skin peeling, discoloration, and warmth. No serous or purulent discharge. Full ROM in ankle. DP/PT pulses 2+.  Recent ED visit 5 days ago with discharge on Keflex and Bactrim without improvement. CT LE suspicious for cellulitis. Doppler U/S negative for DVT. 0/4 SIRS criteria. No signs of abscess. Likely nonpurulent cellulitis. Wound culture in November without MRSA.  - S/p doxycycline  in ED  - Start cefazolin IV 1g q8 for MSSA  - Wound care consult  - Tylenol PRN mild-moderate pain, Ketorolac PRN for severe pain  - F/u Tibia/fibula X-ray read

## 2022-04-27 NOTE — DISCHARGE NOTE PROVIDER - NSDCCPCAREPLAN_GEN_ALL_CORE_FT
PRINCIPAL DISCHARGE DIAGNOSIS  Diagnosis: Cellulitis, leg  Assessment and Plan of Treatment: When you arrived to the hospital you were complaining of having worsening pain, redness, swelling, and itchiness in your right leg. You were treated for cellulitis which is an infection of the skin caused by bacteria. You were given antibiotics through the IV for 2 days during your stay to treat this and your cellulitis has improved. You will now be discharged with antibiotics to take by mouth called Keflex for _____ more days. Please complete the course of antibiotics as prescribed and follow up outpatient with the [facility].         PRINCIPAL DISCHARGE DIAGNOSIS  Diagnosis: Cellulitis, leg  Assessment and Plan of Treatment: When you arrived to the hospital you were complaining of having worsening pain, redness, swelling, and itchiness in your right leg. You were treated for cellulitis which is an infection of the skin caused by bacteria. You were given antibiotics through the IV for 2 days during your stay to treat this and your cellulitis has improved. You will now be discharged with antibiotics to take by mouth called Keflex 500mg every 12 hours for 4 more days. Please complete the course of antibiotics as prescribed and follow up outpatient with the [facility].         PRINCIPAL DISCHARGE DIAGNOSIS  Diagnosis: Cellulitis, leg  Assessment and Plan of Treatment: When you arrived to the hospital you were complaining of having worsening pain, redness, swelling, and itchiness in your right leg. You were treated for cellulitis which is an infection of the skin caused by bacteria. You were given antibiotics through the IV for 2 days during your stay to treat this and your cellulitis has improved. You will now be discharged with antibiotics to take by mouth called Keflex 500mg every 12 hours for 4 more days. You can use moiturizer around the wound for your dry skin and the scabs will heal on their own. Please complete the course of antibiotics as prescribed and follow up outpatient with the [facility].

## 2022-04-27 NOTE — PROGRESS NOTE ADULT - ATTENDING COMMENTS
Patient was seen and examined with the resident team today.  I agree with Dr. Ruiz's assessment and plan with the following exceptions/additions:     Briefly, this is a 52yo woman with a PMH of HS, essential HTN and prior RLE cellulitis (11/2021) who p/w L-calf cellulitis after 2.5-3d on PO abx w/Keflex Bactrim.  Started on Cefazolin with faster improvement in erythema and pain than she received with oral abx.  Do not suspect failure of PO abx, just slow resolution.  Will continue with Cefazolin for full 24 hours today and then assess tomorrow if can safely transition to oral antibiotics.  She will also needs a referral to dermatology to ensure she does not have an underlying process that is putting her at risk for cellulitis as this is her 2nd episode in less than 6 months.    #LLE cellulitis  #HS  #Essential HTN     Lolis Reyes  [phone]

## 2022-04-27 NOTE — PROGRESS NOTE ADULT - SUBJECTIVE AND OBJECTIVE BOX
INTERVAL HPI/OVERNIGHT EVENTS:  As per night team, no overnight events. Patient seen and examined at bedside. Patient only c/o burning in L posterior calf    VITALS  Vital Signs Last 24 Hrs  T(C): 37.1 (27 Apr 2022 06:51), Max: 37.1 (27 Apr 2022 06:51)  T(F): 98.7 (27 Apr 2022 06:51), Max: 98.7 (27 Apr 2022 06:51)  HR: 74 (27 Apr 2022 06:51) (74 - 88)  BP: 141/79 (27 Apr 2022 06:51) (135/81 - 153/80)  BP(mean): --  RR: 19 (27 Apr 2022 06:51) (18 - 19)  SpO2: 99% (27 Apr 2022 06:51) (97% - 100%)    CAPILLARY BLOOD GLUCOSE      PHYSICAL EXAM  General: NAD, sitting comfortably in bed   HEENT: PERRL/ EOMI, no scleral icterus, MMM  Neck: Supple, no JVD  Respiratory: lungs CTA b/l, no wheezes/crackles, no accessory muscle use  Cardiovascular: Regular rhythm/rate; +S1 +S2, no murmurs  Gastrointestinal: Soft, NTND, normoactive BS, no rebound, no guarding  Extremities: WWP, Left posterior calf with erythema and scabbing lesions, no blood or discharge, mild edema vs R calf. pulses intact b/l  Neurological: A&Ox3, no gross focal deficits, follows commands  Skin: Normal temperature, warm, dry      MEDICATIONS  (STANDING):  ceFAZolin   IVPB 1000 milliGRAM(s) IV Intermittent every 8 hours    MEDICATIONS  (PRN):  acetaminophen     Tablet .. 650 milliGRAM(s) Oral every 6 hours PRN Mild Pain (1 - 3), Moderate Pain (4 - 6)  ketorolac   Injectable 15 milliGRAM(s) IV Push every 6 hours PRN Severe Pain (7 - 10)      No Known Allergies      LABS                        10.0   6.07  )-----------( 290      ( 27 Apr 2022 06:45 )             32.1     04-27    138  |  104  |  12  ----------------------------<  92  4.0   |  24  |  0.82    Ca    9.2      27 Apr 2022 06:45  Phos  3.9     04-27  Mg     1.7     04-27    TPro  8.7<H>  /  Alb  3.8  /  TBili  0.4  /  DBili  x   /  AST  17  /  ALT  13  /  AlkPhos  76  04-26              RADIOLOGY & ADDITIONAL TESTS: Reviewed

## 2022-04-27 NOTE — DISCHARGE NOTE PROVIDER - HOSPITAL COURSE
#Discharge: do not delete    Patient is __ yo M/F with past medical history of _____ presented with _____, found to have _____ (one liner)    Hospital course (by problem):     Patient was discharged to: (home/[facility]/acute rehab/hospice, etc, and with what services – home health PT/RN? Home O2?)    New medications:   Changes to old medications:  Medications that were stopped:    Items to follow up as outpatient:    Physical exam at the time of discharge:       #Discharge: do not delete    Patient is a 51 year old female w/ PMHx hidradenitis suppurativa and HTN, p/w 4-week h/o worsening pain, swelling, and skin peeling of her left posterior ankle found to have cellulitis admitted for IV antibiotics.     Hospital course (by problem):     #Cellulitis: 3 week hx of left posterior calf erythema, peeling and burning. ED visit and prescribed bactrim & Keflex for which patient completed 2-3 days with no reported improvement. Now s/p 3 days Cefazolin 2g q8hr with clinical improvement. Low suspicion for MRSA given lack of purulent discharge or MRSA risk factors. D/c with Keflex 500mg q12 for 4 more days.     #Hidradenitis suppurativa: f/u with dermatology outpatient     #HTN: reported HTN diagnoses but reports never taking medication for this. [id number] during admission in the setting of pain. No indication for medication, follow up outpatient.     Patient was discharged to: (home/[facility]/acute rehab/hospice, etc, and with what services – home health PT/RN? Home O2?)    New medications: Keflex 500mg q12hr for 4 days  Changes to old medications:  Medications that were stopped:    Items to follow up as outpatient: f/u dermatology outpatient, f/u HTN with PCP outpatient     Physical exam at the time of discharge:  General: NAD, sitting comfortably in bed   HEENT: PERRL/ EOMI, no scleral icterus, MMM  Neck: Supple, no JVD  Respiratory: lungs CTA b/l, no wheezes/crackles, no accessory muscle use  Cardiovascular: Regular rhythm/rate; +S1 +S2, no murmurs  Gastrointestinal: Soft, NTND, normoactive BS, no rebound, no guarding  Extremities: WWP, Left posterior calf with erythema and scabbing lesions, no blood or discharge, mild edema vs R calf. pulses intact b/l  Neurological: A&Ox3, no gross focal deficits, follows commands  Skin: Normal temperature, warm, dry       #Discharge: do not delete    Patient is a 51 year old female w/ PMHx hidradenitis suppurativa and HTN, p/w 4-week h/o worsening pain, swelling, and skin peeling of her left posterior ankle found to have cellulitis admitted for IV antibiotics.     Hospital course (by problem):     #Cellulitis: 3 week hx of left posterior calf erythema, peeling and burning. ED visit and prescribed bactrim & Keflex for which patient completed 2-3 days with no reported improvement. Now s/p 3 days Cefazolin 2g q8hr with clinical improvement. Low suspicion for MRSA given lack of purulent discharge or MRSA risk factors. D/c with Keflex 500mg q12 for 4 more days.     #Hidradenitis suppurativa: f/u with dermatology outpatient     #HTN: reported HTN diagnoses but reports never taking medication for this. [id number] during admission in the setting of pain. No indication for medication, follow up outpatient.     Patient was discharged to: home    New medications: Keflex 500mg q12hr for 4 days  Changes to old medications:  Medications that were stopped:    Items to follow up as outpatient: f/u dermatology outpatient, f/u HTN with PCP outpatient     Physical exam at the time of discharge:  General: NAD, sitting comfortably in bed   HEENT: PERRL/ EOMI, no scleral icterus, MMM  Neck: Supple, no JVD  Respiratory: lungs CTA b/l, no wheezes/crackles, no accessory muscle use  Cardiovascular: Regular rhythm/rate; +S1 +S2, no murmurs  Gastrointestinal: Soft, NTND, normoactive BS, no rebound, no guarding  Extremities: WWP, Left posterior calf with erythema and scabbing lesions, no blood or discharge, mild edema vs R calf. pulses intact b/l  Neurological: A&Ox3, no gross focal deficits, follows commands  Skin: Normal temperature, warm, dry

## 2022-04-27 NOTE — DISCHARGE NOTE PROVIDER - NSDCFUADDAPPT_GEN_ALL_CORE_FT
FOLLOW UP WITH DR. GRIFFITH FOR FURTHER MONITORING OF YOUR BLOOD PRESSURE    FOLLOW UP WITH YOUR DERMATOLOGIST FOR FURTHER MANAGEMENT OF RECURRENT SKIN INFECTIONS

## 2022-04-27 NOTE — H&P ADULT - NSHPLABSRESULTS_GEN_ALL_CORE
.  LABS:                         10.8   8.15  )-----------( 292      ( 26 Apr 2022 18:59 )             34.3     04-26    137  |  104  |  10  ----------------------------<  86  4.1   |  24  |  0.75    Ca    10.0      26 Apr 2022 19:00    TPro  8.7<H>  /  Alb  3.8  /  TBili  0.4  /  DBili  x   /  AST  17  /  ALT  13  /  AlkPhos  76  04-26                  RADIOLOGY, EKG & ADDITIONAL TESTS: Reviewed.

## 2022-04-27 NOTE — H&P ADULT - ASSESSMENT
51F w/ PMHx hidradenitis suppurativa and HTN, p/w 4-week h/o worsening pain, swelling, and skin peeling of her left posterior ankle concerning for cellulitis with failed outpatient antibiotic treatment.

## 2022-04-27 NOTE — H&P ADULT - PROBLEM SELECTOR PLAN 2
Reports active hidradenitis lesions in intertrigenous regions and inguinal region. H/o lesions in b/l armpits in 2012 s/p excision and skin grafting surgery, which have resolved.  - F/u outpatient dermatology

## 2022-04-28 ENCOUNTER — TRANSCRIPTION ENCOUNTER (OUTPATIENT)
Age: 52
End: 2022-04-28

## 2022-04-28 VITALS
RESPIRATION RATE: 19 BRPM | HEART RATE: 75 BPM | OXYGEN SATURATION: 99 % | DIASTOLIC BLOOD PRESSURE: 75 MMHG | TEMPERATURE: 98 F | SYSTOLIC BLOOD PRESSURE: 130 MMHG

## 2022-04-28 LAB
ANION GAP SERPL CALC-SCNC: 10 MMOL/L — SIGNIFICANT CHANGE UP (ref 5–17)
BUN SERPL-MCNC: 13 MG/DL — SIGNIFICANT CHANGE UP (ref 7–23)
CALCIUM SERPL-MCNC: 9.2 MG/DL — SIGNIFICANT CHANGE UP (ref 8.4–10.5)
CHLORIDE SERPL-SCNC: 104 MMOL/L — SIGNIFICANT CHANGE UP (ref 96–108)
CO2 SERPL-SCNC: 22 MMOL/L — SIGNIFICANT CHANGE UP (ref 22–31)
CREAT SERPL-MCNC: 0.8 MG/DL — SIGNIFICANT CHANGE UP (ref 0.5–1.3)
EGFR: 88 ML/MIN/1.73M2 — SIGNIFICANT CHANGE UP
GLUCOSE SERPL-MCNC: 102 MG/DL — HIGH (ref 70–99)
HCT VFR BLD CALC: 32.6 % — LOW (ref 34.5–45)
HGB BLD-MCNC: 10.3 G/DL — LOW (ref 11.5–15.5)
MAGNESIUM SERPL-MCNC: 1.9 MG/DL — SIGNIFICANT CHANGE UP (ref 1.6–2.6)
MCHC RBC-ENTMCNC: 29.6 PG — SIGNIFICANT CHANGE UP (ref 27–34)
MCHC RBC-ENTMCNC: 31.6 GM/DL — LOW (ref 32–36)
MCV RBC AUTO: 93.7 FL — SIGNIFICANT CHANGE UP (ref 80–100)
NRBC # BLD: 0 /100 WBCS — SIGNIFICANT CHANGE UP (ref 0–0)
PHOSPHATE SERPL-MCNC: 3.8 MG/DL — SIGNIFICANT CHANGE UP (ref 2.5–4.5)
PLATELET # BLD AUTO: 290 K/UL — SIGNIFICANT CHANGE UP (ref 150–400)
POTASSIUM SERPL-MCNC: 4.2 MMOL/L — SIGNIFICANT CHANGE UP (ref 3.5–5.3)
POTASSIUM SERPL-SCNC: 4.2 MMOL/L — SIGNIFICANT CHANGE UP (ref 3.5–5.3)
RBC # BLD: 3.48 M/UL — LOW (ref 3.8–5.2)
RBC # FLD: 13.2 % — SIGNIFICANT CHANGE UP (ref 10.3–14.5)
SODIUM SERPL-SCNC: 136 MMOL/L — SIGNIFICANT CHANGE UP (ref 135–145)
WBC # BLD: 5.53 K/UL — SIGNIFICANT CHANGE UP (ref 3.8–10.5)
WBC # FLD AUTO: 5.53 K/UL — SIGNIFICANT CHANGE UP (ref 3.8–10.5)

## 2022-04-28 PROCEDURE — 99239 HOSP IP/OBS DSCHRG MGMT >30: CPT | Mod: GC

## 2022-04-28 RX ORDER — CEPHALEXIN 500 MG
1 CAPSULE ORAL
Qty: 8 | Refills: 0
Start: 2022-04-28 | End: 2022-05-01

## 2022-04-28 RX ORDER — BNT162B2 0.23 MG/2.25ML
0.3 INJECTION, SUSPENSION INTRAMUSCULAR ONCE
Refills: 0 | Status: COMPLETED | OUTPATIENT
Start: 2022-04-28 | End: 2022-04-28

## 2022-04-28 RX ORDER — TRAMADOL HYDROCHLORIDE 50 MG/1
25 TABLET ORAL ONCE
Refills: 0 | Status: DISCONTINUED | OUTPATIENT
Start: 2022-04-28 | End: 2022-04-28

## 2022-04-28 RX ADMIN — Medication 15 MILLIGRAM(S): at 05:53

## 2022-04-28 RX ADMIN — Medication 650 MILLIGRAM(S): at 02:45

## 2022-04-28 RX ADMIN — Medication 100 MILLIGRAM(S): at 06:01

## 2022-04-28 RX ADMIN — Medication 15 MILLIGRAM(S): at 06:53

## 2022-04-28 RX ADMIN — Medication 650 MILLIGRAM(S): at 01:45

## 2022-04-28 RX ADMIN — Medication 15 MILLIGRAM(S): at 15:13

## 2022-04-28 RX ADMIN — Medication 15 MILLIGRAM(S): at 15:30

## 2022-04-28 RX ADMIN — BNT162B2 0.3 MILLILITER(S): 0.23 INJECTION, SUSPENSION INTRAMUSCULAR at 16:44

## 2022-04-28 RX ADMIN — Medication 650 MILLIGRAM(S): at 07:08

## 2022-04-28 RX ADMIN — Medication 650 MILLIGRAM(S): at 14:56

## 2022-04-28 RX ADMIN — Medication 650 MILLIGRAM(S): at 13:46

## 2022-04-28 RX ADMIN — Medication 100 MILLIGRAM(S): at 13:46

## 2022-04-28 RX ADMIN — TRAMADOL HYDROCHLORIDE 25 MILLIGRAM(S): 50 TABLET ORAL at 10:47

## 2022-04-28 RX ADMIN — TRAMADOL HYDROCHLORIDE 25 MILLIGRAM(S): 50 TABLET ORAL at 11:47

## 2022-04-28 NOTE — DISCHARGE NOTE NURSING/CASE MANAGEMENT/SOCIAL WORK - PATIENT PORTAL LINK FT
You can access the FollowMyHealth Patient Portal offered by [facility] by registering at the following website: [email or web address]. By joining [employer]’s FollowMyHealth portal, you will also be able to view your health information using other applications (apps) compatible with our system.

## 2022-04-28 NOTE — DISCHARGE NOTE NURSING/CASE MANAGEMENT/SOCIAL WORK - NSDCPEFALRISK_GEN_ALL_CORE
For information on Fall & Injury Prevention, visit: [email or web address] OR  [email or web address] OR  https://www.cdc.gov/steadi/patient.html

## 2022-04-28 NOTE — ADVANCED PRACTICE NURSE CONSULT - ASSESSMENT
51F w/ PMHx hidradenitis suppurativa and HTN, p/w 4-week h/o worsening pain, swelling, and skin peeling of her left posterior ankle concerning for cellulitis with failed outpatient antibiotic treatment.  Pt reports having a similar issue with her right calf (abscess) which is now healed but RLE still slightly edematous, area indurated and remains tenderl to touch. LLE wound with scattered scabbing and dry, scaling skin, edema to calf, and tenderness to touch. Minimal drainage, no odor. Pt reports using Medihoney in the past so Medihoney obtained and applied over scabbed areas, Atractain cream applied to surrounding dry skin. Non-adherent dressing applied over wounds and spandage used to secure. Pt reports being able to perform wound care at home, extra supplies left for discharge.

## 2022-05-04 DIAGNOSIS — L03.116 CELLULITIS OF LEFT LOWER LIMB: ICD-10-CM

## 2022-05-04 DIAGNOSIS — I10 ESSENTIAL (PRIMARY) HYPERTENSION: ICD-10-CM

## 2022-05-04 DIAGNOSIS — L73.2 HIDRADENITIS SUPPURATIVA: ICD-10-CM

## 2022-05-30 PROCEDURE — 85025 COMPLETE CBC W/AUTO DIFF WBC: CPT

## 2022-05-30 PROCEDURE — 84100 ASSAY OF PHOSPHORUS: CPT

## 2022-05-30 PROCEDURE — 96374 THER/PROPH/DIAG INJ IV PUSH: CPT

## 2022-05-30 PROCEDURE — 73590 X-RAY EXAM OF LOWER LEG: CPT

## 2022-05-30 PROCEDURE — 36415 COLL VENOUS BLD VENIPUNCTURE: CPT

## 2022-05-30 PROCEDURE — 85027 COMPLETE CBC AUTOMATED: CPT

## 2022-05-30 PROCEDURE — 80053 COMPREHEN METABOLIC PANEL: CPT

## 2022-05-30 PROCEDURE — 80048 BASIC METABOLIC PNL TOTAL CA: CPT

## 2022-05-30 PROCEDURE — 99285 EMERGENCY DEPT VISIT HI MDM: CPT

## 2022-05-30 PROCEDURE — 96375 TX/PRO/DX INJ NEW DRUG ADDON: CPT

## 2022-05-30 PROCEDURE — 83735 ASSAY OF MAGNESIUM: CPT

## 2022-05-30 PROCEDURE — 87635 SARS-COV-2 COVID-19 AMP PRB: CPT

## 2022-08-09 NOTE — ED ADULT NURSE NOTE - NS ED NURSE LEVEL OF CONSCIOUSNESS AFFECT
Calm/Appropriate Ketoconazole Pregnancy And Lactation Text: This medication is Pregnancy Category C and it isn't know if it is safe during pregnancy. It is also excreted in breast milk and breast feeding isn't recommended.

## 2022-11-11 NOTE — ED ADULT TRIAGE NOTE - GLASGOW COMA SCALE: EYE OPENING, MLM
(E4) spontaneous Boris Moses)  Obstetrics and Gynecology  [address] [place] [address]  Phone: [phone]  Fax: [phone]  Follow Up Time: 2 weeks

## 2023-01-27 NOTE — ED ADULT TRIAGE NOTE - AS O2 DELIVERY
Patient was seen in clinic 1/23/2023 and virtual visit yesterday.       Pain continues in flank area , on both sides and into lower pelvic area.    She has urine urgency . Urine is normal in color and odor.      Her pain has been present for 3 months. She is nauseated at times.   Temp is 100.8 oral.     Her pain level is not constant and she has pain at a 10.  She will start to take her percocet only when she has the pain at 8-10.     She is taking Tylenol when she is not taking her percocet.       Can she have her ultrasound earlier? She would need the provider place a more urgent basis vs waiting for her ultrasound appointment next Friday.    Patient is still asking if she can have a CT scan to rule out kidney stone.       Nephrology appointment 2/16/2023.     Ashley Irizarry RN  [facility]      room air

## 2024-04-19 NOTE — ED ADULT NURSE NOTE - PAIN RATING/NUMBER SCALE (0-10): ACTIVITY
[facility]  [address] [place] [address]  Ph [phone]  Fx [phone]    Physical Therapy Progress Note  Patient name: Sandra Segovia Start of Care: 2024   Referral source: Bret Gar* : 1988               Medical Diagnosis: Other symptoms and signs involving the genitourinary system [R39.89]    Onset Date:2021               Treatment Diagnosis: Other symptoms and signs involving the genitourinary system [R39.89]   Prior Hospitalization: see medical history Provider#: [id number]   Medications: Verified on Patient summary List    Comorbidities: uterine fibroids   Prior Level of Function: no pain with pelvic exams or intercourse      Visits from Start of Care: 3                                      Missed Visits: 0    Updated Goals/Measure of Progress: To be achieved in 8 treatments:    Short term goals: To be achieved in 4 treatments:  *Patient will report adherence to HEP as recommended for active participation and progression of interventions during therapy.    Status at evaluation: dilator training to increase PFM awareness prescribed, with emphasis on \"basement\" of elevator/basement exercise, fiber handout  PN:  pt reports compliance with HEP.  Patient reports trying to use the \"basement\" to help with bulge. Progressing  4/10/2024  Current:  pt reports doing the HEP consistently and does the elevator/basement 2x/week.  Progressing 2024     *Bowel: Patient will report successful implementation of bowel routine including increasing water intake to 64 ounces and increasing fiber to improve bowel regularity to 1 bowel movement daily. .   Status at evaluation: patient currently drinks 32 ounces water and some fiber, reporting bowel movement 1x/ 2-3 days  PN:  Pt reports consuming about 36 ounces of water.  Patient was using honey as a laxative - was having a bowel movement almost daily (ran out of honey).  Progressing   Current:  pt  8

## 2024-06-25 ENCOUNTER — APPOINTMENT (OUTPATIENT)
Dept: INTERNAL MEDICINE | Facility: CLINIC | Age: 54
End: 2024-06-25

## 2024-06-26 ENCOUNTER — APPOINTMENT (OUTPATIENT)
Dept: INTERNAL MEDICINE | Facility: CLINIC | Age: 54
End: 2024-06-26

## 2024-06-28 ENCOUNTER — APPOINTMENT (OUTPATIENT)
Dept: INTERNAL MEDICINE | Facility: CLINIC | Age: 54
End: 2024-06-28

## 2024-08-15 ENCOUNTER — APPOINTMENT (OUTPATIENT)
Dept: INTERNAL MEDICINE | Facility: CLINIC | Age: 54
End: 2024-08-15

## 2024-08-30 ENCOUNTER — APPOINTMENT (OUTPATIENT)
Dept: INTERNAL MEDICINE | Facility: CLINIC | Age: 54
End: 2024-08-30
Payer: MEDICAID

## 2024-08-30 VITALS
HEIGHT: 68 IN | BODY MASS INDEX: 36.07 KG/M2 | WEIGHT: 238 LBS | SYSTOLIC BLOOD PRESSURE: 153 MMHG | HEART RATE: 70 BPM | DIASTOLIC BLOOD PRESSURE: 77 MMHG | TEMPERATURE: 97.4 F | OXYGEN SATURATION: 98 %

## 2024-08-30 DIAGNOSIS — Z00.00 ENCOUNTER FOR GENERAL ADULT MEDICAL EXAMINATION W/OUT ABNORMAL FINDINGS: ICD-10-CM

## 2024-08-30 PROCEDURE — 99396 PREV VISIT EST AGE 40-64: CPT

## 2024-08-30 NOTE — PHYSICAL EXAM
[Normal] : affect was normal and insight and judgment were intact [de-identified] : healed scarrig on R posterior calf

## 2024-08-30 NOTE — HISTORY OF PRESENT ILLNESS
[FreeTextEntry1] : Pt is here for her physical and also needs referrals. [de-identified] : 53 y/o Female PMHx hidradenitis suppurativa, previous cellulitis, HTN, and pre-DM presenting today for a CPE. Pt reports right shoulder pain in June which she went to the ED where imaging was done showing tendonitis which she was told to see ortho for. Today pt feel well with no acute complaints at this time.

## 2024-08-30 NOTE — HEALTH RISK ASSESSMENT
[0] : 2) Feeling down, depressed, or hopeless: Not at all (0) [PHQ-9 Negative - No further assessment needed] : PHQ-9 Negative - No further assessment needed [Never] : Never [Good] : ~his/her~  mood as  good [Yes] : Yes [Monthly or less (1 pt)] : Monthly or less (1 point) [1 or 2 (0 pts)] : 1 or 2 (0 points) [Never (0 pts)] : Never (0 points) [No falls in past year] : Patient reported no falls in the past year [HIV Test offered] : HIV Test offered [Alone] : lives alone [Retired] : retired [College] : College [Fully functional (bathing, dressing, toileting, transferring, walking, feeding)] : Fully functional (bathing, dressing, toileting, transferring, walking, feeding) [Fully functional (using the telephone, shopping, preparing meals, housekeeping, doing laundry, using] : Fully functional and needs no help or supervision to perform IADLs (using the telephone, shopping, preparing meals, housekeeping, doing laundry, using transportation, managing medications and managing finances) [Smoke Detector] : smoke detector [Carbon Monoxide Detector] : carbon monoxide detector [Seat Belt] :  uses seat belt [[id number]] : 0 [MammogramDate] : 2021 [MammogramComments] : Incomplete BI-RADS 0 [HIVComments] : Pt reports negative in the past

## 2024-08-30 NOTE — ASSESSMENT
[FreeTextEntry1] : 53 y/o Female PMHx hidradenitis suppurativa, previous cellulitis, HTN, and pre-DM presenting today for a CPE  #HTN Pt with BP above goal in office today. Pt states she is not taking any medications (and never has). Pt does not want to start medications or be prescribed a home BP cuff. Discussed the importance of blood pressure control. Pt states that she is able to take BP at home and she will keep a BP log and call with results in 1-2 weeks - f/u BP log 1-2 wks  - f/u BMP   #Pre DM  Last A1c 5.8% (2021) - f/u A1c   #Transpotation forms? Pt requesting transportation forms as she previously qualified 2/2 LE cellulitis. However now she does not have indication. Pt reports that she may still qualify for transportation. Pt will follow up with insurance regarding this and let us know.   #HCM  - f/u HCV - Obgyn referral sent (due for cervical CA screening)  - Sharon referral sent  - Pt deferrers C-scope/FOBT

## 2024-08-30 NOTE — END OF VISIT
[FreeTextEntry3] : 55 yo female with hx HS, HTN, preDM here for CPE, no complaints.  1) HTN - above goal, adamantly declines medication or home BP monitoring. counseled risks of longstanding HTN. 2) HCM refer to gyn check a1c, bmp refer for mammo refuses cscope or FIT

## 2025-04-01 NOTE — PATIENT PROFILE ADULT - FUNCTIONAL ASSESSMENT - DAILY ACTIVITY 6.
A refill request was received for:  Requested Prescriptions     Pending Prescriptions Disp Refills    metoprolol succinate ER 25 MG Oral Tablet 24 Hr [Pharmacy Med Name: METOPROLOL SUCC ER 25 MG TAB] 135 tablet 2     Sig: TAKE 1 TABLET BY MOUTH EVERY MORNING AND 1/2 TABLET EVERY EVENING       Last refill date:7/12/2024       Last office visit:10/23/2024     Follow up due:  Future Appointments   Date Time Provider Department Center   5/16/2025  8:30 AM Peter Hinton DPM [id number]   5/30/2025  7:30 AM Avi Pratt MD [facility] [address] [facility] [address]   12/8/2025  6:30 AM Wilver Olmstead MD [facility]          4 = No assist / stand by assistance